# Patient Record
Sex: MALE | Race: WHITE | NOT HISPANIC OR LATINO | URBAN - METROPOLITAN AREA
[De-identification: names, ages, dates, MRNs, and addresses within clinical notes are randomized per-mention and may not be internally consistent; named-entity substitution may affect disease eponyms.]

---

## 2017-03-06 RX ORDER — INSULIN LISPRO 100/ML
0 VIAL (ML) SUBCUTANEOUS
Qty: 60 | Refills: 0 | DISCHARGE
Start: 2017-03-06

## 2017-04-19 RX ORDER — IBUPROFEN 200 MG
0 TABLET ORAL
Qty: 20 | Refills: 0 | DISCHARGE
Start: 2017-04-19

## 2017-04-26 RX ORDER — LEVOTHYROXINE SODIUM 125 MCG
0 TABLET ORAL
Qty: 90 | Refills: 0 | DISCHARGE
Start: 2017-04-26

## 2017-05-03 ENCOUNTER — OUTPATIENT (OUTPATIENT)
Dept: OUTPATIENT SERVICES | Facility: HOSPITAL | Age: 57
LOS: 1 days | End: 2017-05-03
Payer: COMMERCIAL

## 2017-05-03 VITALS
TEMPERATURE: 98 F | RESPIRATION RATE: 16 BRPM | SYSTOLIC BLOOD PRESSURE: 146 MMHG | HEIGHT: 68 IN | DIASTOLIC BLOOD PRESSURE: 83 MMHG | WEIGHT: 184.53 LBS

## 2017-05-03 DIAGNOSIS — M75.00 ADHESIVE CAPSULITIS OF UNSPECIFIED SHOULDER: ICD-10-CM

## 2017-05-03 DIAGNOSIS — E03.9 HYPOTHYROIDISM, UNSPECIFIED: ICD-10-CM

## 2017-05-03 DIAGNOSIS — Z01.818 ENCOUNTER FOR OTHER PREPROCEDURAL EXAMINATION: ICD-10-CM

## 2017-05-03 DIAGNOSIS — G56.02 CARPAL TUNNEL SYNDROME, LEFT UPPER LIMB: Chronic | ICD-10-CM

## 2017-05-03 DIAGNOSIS — Z90.89 ACQUIRED ABSENCE OF OTHER ORGANS: Chronic | ICD-10-CM

## 2017-05-03 DIAGNOSIS — E11.9 TYPE 2 DIABETES MELLITUS WITHOUT COMPLICATIONS: ICD-10-CM

## 2017-05-03 LAB
ALBUMIN SERPL ELPH-MCNC: 3.7 G/DL — SIGNIFICANT CHANGE UP (ref 3.3–5)
ALP SERPL-CCNC: 92 U/L — SIGNIFICANT CHANGE UP (ref 40–120)
ALT FLD-CCNC: 34 U/L — SIGNIFICANT CHANGE UP (ref 12–78)
ANION GAP SERPL CALC-SCNC: 9 MMOL/L — SIGNIFICANT CHANGE UP (ref 5–17)
AST SERPL-CCNC: 22 U/L — SIGNIFICANT CHANGE UP (ref 15–37)
BILIRUB SERPL-MCNC: 0.4 MG/DL — SIGNIFICANT CHANGE UP (ref 0.2–1.2)
BUN SERPL-MCNC: 16 MG/DL — SIGNIFICANT CHANGE UP (ref 7–23)
CALCIUM SERPL-MCNC: 9 MG/DL — SIGNIFICANT CHANGE UP (ref 8.5–10.1)
CHLORIDE SERPL-SCNC: 105 MMOL/L — SIGNIFICANT CHANGE UP (ref 96–108)
CO2 SERPL-SCNC: 26 MMOL/L — SIGNIFICANT CHANGE UP (ref 22–31)
CREAT SERPL-MCNC: 0.87 MG/DL — SIGNIFICANT CHANGE UP (ref 0.5–1.3)
GLUCOSE SERPL-MCNC: 81 MG/DL — SIGNIFICANT CHANGE UP (ref 70–99)
HBA1C BLD-MCNC: 6.8 % — HIGH (ref 4–5.6)
HCT VFR BLD CALC: 44.5 % — SIGNIFICANT CHANGE UP (ref 39–50)
HGB BLD-MCNC: 15.1 G/DL — SIGNIFICANT CHANGE UP (ref 13–17)
MCHC RBC-ENTMCNC: 32 PG — SIGNIFICANT CHANGE UP (ref 27–34)
MCHC RBC-ENTMCNC: 34 GM/DL — SIGNIFICANT CHANGE UP (ref 32–36)
MCV RBC AUTO: 94.2 FL — SIGNIFICANT CHANGE UP (ref 80–100)
PLATELET # BLD AUTO: 267 K/UL — SIGNIFICANT CHANGE UP (ref 150–400)
POTASSIUM SERPL-MCNC: 3.8 MMOL/L — SIGNIFICANT CHANGE UP (ref 3.5–5.3)
POTASSIUM SERPL-SCNC: 3.8 MMOL/L — SIGNIFICANT CHANGE UP (ref 3.5–5.3)
PROT SERPL-MCNC: 7.4 G/DL — SIGNIFICANT CHANGE UP (ref 6–8.3)
RBC # BLD: 4.72 M/UL — SIGNIFICANT CHANGE UP (ref 4.2–5.8)
RBC # FLD: 12.3 % — SIGNIFICANT CHANGE UP (ref 10.3–14.5)
SODIUM SERPL-SCNC: 140 MMOL/L — SIGNIFICANT CHANGE UP (ref 135–145)
WBC # BLD: 10.8 K/UL — HIGH (ref 3.8–10.5)
WBC # FLD AUTO: 10.8 K/UL — HIGH (ref 3.8–10.5)

## 2017-05-03 PROCEDURE — 83036 HEMOGLOBIN GLYCOSYLATED A1C: CPT

## 2017-05-03 PROCEDURE — 93010 ELECTROCARDIOGRAM REPORT: CPT | Mod: NC

## 2017-05-03 PROCEDURE — G0463: CPT

## 2017-05-03 PROCEDURE — 85027 COMPLETE CBC AUTOMATED: CPT

## 2017-05-03 PROCEDURE — 80053 COMPREHEN METABOLIC PANEL: CPT

## 2017-05-03 PROCEDURE — 93005 ELECTROCARDIOGRAM TRACING: CPT

## 2017-05-03 RX ORDER — DEXTROSE 50 % IN WATER 50 %
1 SYRINGE (ML) INTRAVENOUS ONCE
Qty: 0 | Refills: 0 | Status: DISCONTINUED | OUTPATIENT
Start: 2017-05-10 | End: 2017-05-25

## 2017-05-03 RX ORDER — SODIUM CHLORIDE 9 MG/ML
1000 INJECTION, SOLUTION INTRAVENOUS
Qty: 0 | Refills: 0 | Status: DISCONTINUED | OUTPATIENT
Start: 2017-05-10 | End: 2017-05-25

## 2017-05-03 NOTE — H&P PST ADULT - ASSESSMENT
57 yo male scheduled for Right Shoulder Arthroscopy Arthrotomy Manipulation Calcium Possible Rotator Cuff Repair Clavicle Resection with Dr Clayton on 5/10/17. 55 yo male with Type 1 DM  scheduled for Right Shoulder Arthroscopy Arthrotomy Manipulation Calcium Possible Rotator Cuff Repair Clavicle Resection with Dr Clayton on 5/10/17.

## 2017-05-03 NOTE — H&P PST ADULT - PMH
Adhesive capsulitis of shoulder, unspecified laterality    Diabetes mellitus  Type 1  Hypothyroidism

## 2017-05-03 NOTE — H&P PST ADULT - PROBLEM SELECTOR PLAN 1
57 yo male scheduled for Right Shoulder Arthroscopy Arthrotomy Manipulation Calcium Possible Rotator Cuff Repair Clavicle Resection with Dr Clayton on 5/10/17.  Check labs CBC CMP HGB A1C   EKG  Endocrinology Clearance Patient sees Endo every 3 months, does not have PCP  Hibiclens and Preop Instructions given  5/3 stop Motrin

## 2017-05-03 NOTE — H&P PST ADULT - NSANTHOSAYNRD_GEN_A_CORE
No. MELANIE screening performed.  STOP BANG Legend: 0-2 = LOW Risk; 3-4 = INTERMEDIATE Risk; 5-8 = HIGH Risk

## 2017-05-03 NOTE — H&P PST ADULT - PROBLEM SELECTOR PLAN 2
Patient is aware that he will need Endocrinology Clearance if HGB A1C is 9 or greater  Insulin Pump Dosing as per Endocrinologist  Fingerstick on admission  Hypoglycemia Protocol  Bring extra Insulin Pump Tubing the morning of surgery Patient is aware that he will need Endocrinology Clearance if HGB A1C is 9 or greater  Insulin Pump Dosing as per Endocrinologist  Fingerstick on admission  Pat Weil NP CDE in to speak with patient  Hypoglycemia Protocol  Bring 3 extra Insulin Pump Tubing the morning of surgery

## 2017-05-03 NOTE — H&P PST ADULT - FAMILY HISTORY
Mother  Still living? No  Family history of COPD (chronic obstructive pulmonary disease), Age at diagnosis: Age Unknown  Family history of bowel disorder, Age at diagnosis: Age Unknown     Father  Still living? No  Family history of cardiac disorder, Age at diagnosis: Age Unknown

## 2017-05-03 NOTE — H&P PST ADULT - HISTORY OF PRESENT ILLNESS
Patient states he has had pain in right shoulder since last year.  Pain has been intermittent, some relief from Cortisone Injection. Pain is having numbness in right hand.  Pain is worse with activity.  Right Hand Dominant 55 yo male with Type 1 DM on Insulin Pump scheduled for Right Shoulder Arthroscopy Arthrotomy Manipulation Calcium Possible Rotator Cuff Repair Clavicle Resection with Dr Clayton on 5/10/17. Patient states he has had pain in right shoulder since last year.  Pain has been intermittent, some relief from Cortisone Injection but Cortisone affected his blood sugars. Pain is having numbness in right hand.  Pain is worse with activity.  Right Hand Dominant

## 2017-05-09 RX ORDER — SODIUM CHLORIDE 9 MG/ML
1000 INJECTION, SOLUTION INTRAVENOUS
Qty: 0 | Refills: 0 | Status: DISCONTINUED | OUTPATIENT
Start: 2017-05-10 | End: 2017-05-10

## 2017-05-10 ENCOUNTER — OUTPATIENT (OUTPATIENT)
Dept: OUTPATIENT SERVICES | Facility: HOSPITAL | Age: 57
LOS: 1 days | Discharge: HOME | End: 2017-05-10
Payer: COMMERCIAL

## 2017-05-10 ENCOUNTER — TRANSCRIPTION ENCOUNTER (OUTPATIENT)
Age: 57
End: 2017-05-10

## 2017-05-10 ENCOUNTER — RESULT REVIEW (OUTPATIENT)
Age: 57
End: 2017-05-10

## 2017-05-10 VITALS
SYSTOLIC BLOOD PRESSURE: 128 MMHG | HEART RATE: 76 BPM | OXYGEN SATURATION: 95 % | DIASTOLIC BLOOD PRESSURE: 62 MMHG | RESPIRATION RATE: 14 BRPM

## 2017-05-10 VITALS
RESPIRATION RATE: 14 BRPM | HEART RATE: 70 BPM | WEIGHT: 184.53 LBS | SYSTOLIC BLOOD PRESSURE: 127 MMHG | DIASTOLIC BLOOD PRESSURE: 71 MMHG | HEIGHT: 68 IN | TEMPERATURE: 98 F | OXYGEN SATURATION: 95 %

## 2017-05-10 DIAGNOSIS — G56.02 CARPAL TUNNEL SYNDROME, LEFT UPPER LIMB: Chronic | ICD-10-CM

## 2017-05-10 DIAGNOSIS — M75.00 ADHESIVE CAPSULITIS OF UNSPECIFIED SHOULDER: ICD-10-CM

## 2017-05-10 DIAGNOSIS — Z90.89 ACQUIRED ABSENCE OF OTHER ORGANS: Chronic | ICD-10-CM

## 2017-05-10 LAB
ANION GAP SERPL CALC-SCNC: 8 MMOL/L — SIGNIFICANT CHANGE UP (ref 5–17)
BUN SERPL-MCNC: 23 MG/DL — SIGNIFICANT CHANGE UP (ref 7–23)
CALCIUM SERPL-MCNC: 8.2 MG/DL — LOW (ref 8.5–10.1)
CHLORIDE SERPL-SCNC: 105 MMOL/L — SIGNIFICANT CHANGE UP (ref 96–108)
CO2 SERPL-SCNC: 25 MMOL/L — SIGNIFICANT CHANGE UP (ref 22–31)
CREAT SERPL-MCNC: 0.98 MG/DL — SIGNIFICANT CHANGE UP (ref 0.5–1.3)
GLUCOSE SERPL-MCNC: 252 MG/DL — HIGH (ref 70–99)
POTASSIUM SERPL-MCNC: 4.1 MMOL/L — SIGNIFICANT CHANGE UP (ref 3.5–5.3)
POTASSIUM SERPL-SCNC: 4.1 MMOL/L — SIGNIFICANT CHANGE UP (ref 3.5–5.3)
SODIUM SERPL-SCNC: 138 MMOL/L — SIGNIFICANT CHANGE UP (ref 135–145)

## 2017-05-10 PROCEDURE — 88304 TISSUE EXAM BY PATHOLOGIST: CPT

## 2017-05-10 PROCEDURE — 71045 X-RAY EXAM CHEST 1 VIEW: CPT

## 2017-05-10 PROCEDURE — 88304 TISSUE EXAM BY PATHOLOGIST: CPT | Mod: 26

## 2017-05-10 PROCEDURE — 71010: CPT | Mod: 26

## 2017-05-10 PROCEDURE — 29824 SHO ARTHRS SRG DSTL CLAVICLC: CPT | Mod: RT

## 2017-05-10 PROCEDURE — 29827 SHO ARTHRS SRG RT8TR CUF RPR: CPT | Mod: RT

## 2017-05-10 PROCEDURE — 80048 BASIC METABOLIC PNL TOTAL CA: CPT

## 2017-05-10 PROCEDURE — C1713: CPT

## 2017-05-10 RX ORDER — SODIUM CHLORIDE 9 MG/ML
1000 INJECTION, SOLUTION INTRAVENOUS
Qty: 0 | Refills: 0 | Status: DISCONTINUED | OUTPATIENT
Start: 2017-05-10 | End: 2017-05-10

## 2017-05-10 RX ORDER — CEFAZOLIN SODIUM 1 G
2000 VIAL (EA) INJECTION ONCE
Qty: 0 | Refills: 0 | Status: COMPLETED | OUTPATIENT
Start: 2017-05-10 | End: 2017-05-10

## 2017-05-10 RX ORDER — OXYCODONE HYDROCHLORIDE 5 MG/1
1 TABLET ORAL
Qty: 30 | Refills: 0
Start: 2017-05-10

## 2017-05-10 RX ORDER — HYDROMORPHONE HYDROCHLORIDE 2 MG/ML
0.5 INJECTION INTRAMUSCULAR; INTRAVENOUS; SUBCUTANEOUS
Qty: 0 | Refills: 0 | Status: DISCONTINUED | OUTPATIENT
Start: 2017-05-10 | End: 2017-05-10

## 2017-05-10 RX ORDER — ONDANSETRON 8 MG/1
4 TABLET, FILM COATED ORAL ONCE
Qty: 0 | Refills: 0 | Status: COMPLETED | OUTPATIENT
Start: 2017-05-10 | End: 2017-05-10

## 2017-05-10 RX ADMIN — ONDANSETRON 4 MILLIGRAM(S): 8 TABLET, FILM COATED ORAL at 13:09

## 2017-05-10 RX ADMIN — HYDROMORPHONE HYDROCHLORIDE 0.5 MILLIGRAM(S): 2 INJECTION INTRAMUSCULAR; INTRAVENOUS; SUBCUTANEOUS at 10:45

## 2017-05-10 RX ADMIN — SODIUM CHLORIDE 50 MILLILITER(S): 9 INJECTION, SOLUTION INTRAVENOUS at 10:21

## 2017-05-10 RX ADMIN — HYDROMORPHONE HYDROCHLORIDE 0.5 MILLIGRAM(S): 2 INJECTION INTRAMUSCULAR; INTRAVENOUS; SUBCUTANEOUS at 10:18

## 2017-05-10 RX ADMIN — SODIUM CHLORIDE 30 MILLILITER(S): 9 INJECTION, SOLUTION INTRAVENOUS at 06:55

## 2017-05-10 RX ADMIN — HYDROMORPHONE HYDROCHLORIDE 0.5 MILLIGRAM(S): 2 INJECTION INTRAMUSCULAR; INTRAVENOUS; SUBCUTANEOUS at 11:26

## 2017-05-10 RX ADMIN — HYDROMORPHONE HYDROCHLORIDE 0.5 MILLIGRAM(S): 2 INJECTION INTRAMUSCULAR; INTRAVENOUS; SUBCUTANEOUS at 11:10

## 2017-05-10 RX ADMIN — HYDROMORPHONE HYDROCHLORIDE 0.5 MILLIGRAM(S): 2 INJECTION INTRAMUSCULAR; INTRAVENOUS; SUBCUTANEOUS at 11:43

## 2017-05-10 RX ADMIN — HYDROMORPHONE HYDROCHLORIDE 0.5 MILLIGRAM(S): 2 INJECTION INTRAMUSCULAR; INTRAVENOUS; SUBCUTANEOUS at 10:33

## 2017-05-10 RX ADMIN — HYDROMORPHONE HYDROCHLORIDE 0.5 MILLIGRAM(S): 2 INJECTION INTRAMUSCULAR; INTRAVENOUS; SUBCUTANEOUS at 10:32

## 2017-05-10 RX ADMIN — HYDROMORPHONE HYDROCHLORIDE 0.5 MILLIGRAM(S): 2 INJECTION INTRAMUSCULAR; INTRAVENOUS; SUBCUTANEOUS at 11:28

## 2017-05-10 NOTE — BRIEF OPERATIVE NOTE - PROCEDURE
Shoulder arthroscopy  05/10/2017  Right shoulder arthroscopy with:  1. Labral debridement  2. Subacromial decompression  3. Distal clavicle excision  4. Exploration/inspection of rotator cuff/calcific deposit  5. Rotator cuff repair with [employer] Speed Screw x 1  Active  ERENDIRAELLO

## 2017-05-10 NOTE — BRIEF OPERATIVE NOTE - POST-OP DX
AC (acromioclavicular) arthritis  05/10/2017    Phuc Cmaeron  Calcific tendonitis of right shoulder  05/10/2017    Phuc Cameron  Degenerative tear of glenoid labrum of right shoulder  05/10/2017  Degeneration/fraying of right glenoid labrum  Phuc Cameron  Subacromial impingement, right  05/10/2017    Phuc Cameron

## 2017-05-10 NOTE — ASU DISCHARGE PLAN (ADULT/PEDIATRIC). - MEDICATION SUMMARY - MEDICATIONS TO TAKE
I will START or STAY ON the medications listed below when I get home from the hospital:    Probiotics  --     -- Indication: For prior med    acetaminophen-oxycodone 325 mg-5 mg oral tablet  -- 1 tab(s) by mouth every 6 hours, As Needed -for severe pain MDD:4  -- Caution federal law prohibits the transfer of this drug to any person other  than the person for whom it was prescribed.  May cause drowsiness.  Alcohol may intensify this effect.  Use care when operating dangerous machinery.  This prescription cannot be refilled.  This product contains acetaminophen.  Do not use  with any other product containing acetaminophen to prevent possible liver damage.  Using more of this medication than prescribed may cause serious breathing problems.    -- Indication: For pain    IBUPROFEN 800 MG TABLET  -- Indication: For prior med    HUMALOG 100 UNITS/ML VIAL  -- Indication: For prior med    SYNTHROID 100 MCG TABLET  -- Indication: For prior med

## 2017-05-10 NOTE — PROVIDER CONTACT NOTE (OTHER) - SITUATION
Pt o2 sat drops into high 80s when o2 is removed.  Sat improves when using incentive spirometer however drops as soon as he stops.  Pt noted to be diaphoretic.

## 2017-05-11 LAB — SURGICAL PATHOLOGY FINAL REPORT - CH: SIGNIFICANT CHANGE UP

## 2017-05-13 DIAGNOSIS — Z87.891 PERSONAL HISTORY OF NICOTINE DEPENDENCE: ICD-10-CM

## 2017-05-13 DIAGNOSIS — M19.011 PRIMARY OSTEOARTHRITIS, RIGHT SHOULDER: ICD-10-CM

## 2017-05-13 DIAGNOSIS — M75.101 UNSPECIFIED ROTATOR CUFF TEAR OR RUPTURE OF RIGHT SHOULDER, NOT SPECIFIED AS TRAUMATIC: ICD-10-CM

## 2017-05-13 DIAGNOSIS — M75.31 CALCIFIC TENDINITIS OF RIGHT SHOULDER: ICD-10-CM

## 2017-05-13 DIAGNOSIS — Z79.1 LONG TERM (CURRENT) USE OF NON-STEROIDAL ANTI-INFLAMMATORIES (NSAID): ICD-10-CM

## 2017-05-13 DIAGNOSIS — Z79.4 LONG TERM (CURRENT) USE OF INSULIN: ICD-10-CM

## 2017-05-13 DIAGNOSIS — E03.9 HYPOTHYROIDISM, UNSPECIFIED: ICD-10-CM

## 2017-05-13 DIAGNOSIS — M75.41 IMPINGEMENT SYNDROME OF RIGHT SHOULDER: ICD-10-CM

## 2017-05-13 DIAGNOSIS — E10.9 TYPE 1 DIABETES MELLITUS WITHOUT COMPLICATIONS: ICD-10-CM

## 2017-05-13 DIAGNOSIS — Z96.41 PRESENCE OF INSULIN PUMP (EXTERNAL) (INTERNAL): ICD-10-CM

## 2017-05-13 DIAGNOSIS — M67.813 OTHER SPECIFIED DISORDERS OF TENDON, RIGHT SHOULDER: ICD-10-CM

## 2019-07-09 PROBLEM — M75.00 ADHESIVE CAPSULITIS OF UNSPECIFIED SHOULDER: Chronic | Status: ACTIVE | Noted: 2017-05-03

## 2019-07-30 ENCOUNTER — OUTPATIENT (OUTPATIENT)
Dept: OUTPATIENT SERVICES | Facility: HOSPITAL | Age: 59
LOS: 1 days | End: 2019-07-30
Payer: COMMERCIAL

## 2019-07-30 VITALS
WEIGHT: 179.9 LBS | OXYGEN SATURATION: 98 % | DIASTOLIC BLOOD PRESSURE: 60 MMHG | TEMPERATURE: 98 F | HEART RATE: 68 BPM | RESPIRATION RATE: 16 BRPM | SYSTOLIC BLOOD PRESSURE: 132 MMHG | HEIGHT: 68 IN

## 2019-07-30 DIAGNOSIS — Z90.89 ACQUIRED ABSENCE OF OTHER ORGANS: Chronic | ICD-10-CM

## 2019-07-30 DIAGNOSIS — G56.01 CARPAL TUNNEL SYNDROME, RIGHT UPPER LIMB: ICD-10-CM

## 2019-07-30 DIAGNOSIS — G56.02 CARPAL TUNNEL SYNDROME, LEFT UPPER LIMB: Chronic | ICD-10-CM

## 2019-07-30 DIAGNOSIS — Z01.818 ENCOUNTER FOR OTHER PREPROCEDURAL EXAMINATION: ICD-10-CM

## 2019-07-30 LAB
ANION GAP SERPL CALC-SCNC: 6 MMOL/L — SIGNIFICANT CHANGE UP (ref 5–17)
BUN SERPL-MCNC: 21 MG/DL — SIGNIFICANT CHANGE UP (ref 7–23)
CALCIUM SERPL-MCNC: 9.1 MG/DL — SIGNIFICANT CHANGE UP (ref 8.5–10.1)
CHLORIDE SERPL-SCNC: 106 MMOL/L — SIGNIFICANT CHANGE UP (ref 96–108)
CO2 SERPL-SCNC: 28 MMOL/L — SIGNIFICANT CHANGE UP (ref 22–31)
CREAT SERPL-MCNC: 0.93 MG/DL — SIGNIFICANT CHANGE UP (ref 0.5–1.3)
GLUCOSE SERPL-MCNC: 138 MG/DL — HIGH (ref 70–99)
HBA1C BLD-MCNC: 6.7 % — HIGH (ref 4–5.6)
HCT VFR BLD CALC: 44 % — SIGNIFICANT CHANGE UP (ref 39–50)
HGB BLD-MCNC: 14.5 G/DL — SIGNIFICANT CHANGE UP (ref 13–17)
MCHC RBC-ENTMCNC: 31.5 PG — SIGNIFICANT CHANGE UP (ref 27–34)
MCHC RBC-ENTMCNC: 33 GM/DL — SIGNIFICANT CHANGE UP (ref 32–36)
MCV RBC AUTO: 95.4 FL — SIGNIFICANT CHANGE UP (ref 80–100)
NRBC # BLD: 0 /100 WBCS — SIGNIFICANT CHANGE UP (ref 0–0)
PLATELET # BLD AUTO: 287 K/UL — SIGNIFICANT CHANGE UP (ref 150–400)
POTASSIUM SERPL-MCNC: 4.2 MMOL/L — SIGNIFICANT CHANGE UP (ref 3.5–5.3)
POTASSIUM SERPL-SCNC: 4.2 MMOL/L — SIGNIFICANT CHANGE UP (ref 3.5–5.3)
RBC # BLD: 4.61 M/UL — SIGNIFICANT CHANGE UP (ref 4.2–5.8)
RBC # FLD: 13.2 % — SIGNIFICANT CHANGE UP (ref 10.3–14.5)
SODIUM SERPL-SCNC: 140 MMOL/L — SIGNIFICANT CHANGE UP (ref 135–145)
WBC # BLD: 9.5 K/UL — SIGNIFICANT CHANGE UP (ref 3.8–10.5)
WBC # FLD AUTO: 9.5 K/UL — SIGNIFICANT CHANGE UP (ref 3.8–10.5)

## 2019-07-30 PROCEDURE — 85027 COMPLETE CBC AUTOMATED: CPT

## 2019-07-30 PROCEDURE — 83036 HEMOGLOBIN GLYCOSYLATED A1C: CPT

## 2019-07-30 PROCEDURE — 93005 ELECTROCARDIOGRAM TRACING: CPT

## 2019-07-30 PROCEDURE — 93010 ELECTROCARDIOGRAM REPORT: CPT

## 2019-07-30 PROCEDURE — 80048 BASIC METABOLIC PNL TOTAL CA: CPT

## 2019-07-30 PROCEDURE — 36415 COLL VENOUS BLD VENIPUNCTURE: CPT

## 2019-07-30 PROCEDURE — G0463: CPT

## 2019-07-30 RX ORDER — DEXTROSE 50 % IN WATER 50 %
12.5 SYRINGE (ML) INTRAVENOUS ONCE
Refills: 0 | Status: DISCONTINUED | OUTPATIENT
Start: 2019-08-07 | End: 2019-08-29

## 2019-07-30 RX ORDER — SODIUM CHLORIDE 9 MG/ML
1000 INJECTION, SOLUTION INTRAVENOUS
Refills: 0 | Status: DISCONTINUED | OUTPATIENT
Start: 2019-08-07 | End: 2019-08-29

## 2019-07-30 RX ORDER — GLUCAGON INJECTION, SOLUTION 0.5 MG/.1ML
1 INJECTION, SOLUTION SUBCUTANEOUS ONCE
Refills: 0 | Status: DISCONTINUED | OUTPATIENT
Start: 2019-08-07 | End: 2019-08-29

## 2019-07-30 RX ORDER — DEXTROSE 50 % IN WATER 50 %
25 SYRINGE (ML) INTRAVENOUS ONCE
Refills: 0 | Status: DISCONTINUED | OUTPATIENT
Start: 2019-08-07 | End: 2019-08-29

## 2019-07-30 RX ORDER — DEXTROSE 50 % IN WATER 50 %
15 SYRINGE (ML) INTRAVENOUS ONCE
Refills: 0 | Status: DISCONTINUED | OUTPATIENT
Start: 2019-08-07 | End: 2019-08-29

## 2019-07-30 NOTE — H&P PST ADULT - NSICDXPASTSURGICALHX_GEN_ALL_CORE_FT
PAST SURGICAL HISTORY:  Acute carpal tunnel syndrome of left wrist and left thumb trigger finger 2013    S/P tonsillectomy

## 2019-07-30 NOTE — H&P PST ADULT - URINARY CATHETER
[facility] [place] no

## 2019-07-30 NOTE — H&P PST ADULT - NSICDXPASTMEDICALHX_GEN_ALL_CORE_FT
PAST MEDICAL HISTORY:  Adhesive capsulitis of shoulder, unspecified laterality     Diabetes mellitus Type 1    Hypothyroidism

## 2019-07-30 NOTE — H&P PST ADULT - VISION (WITH CORRECTIVE LENSES IF THE PATIENT USUALLY WEARS THEM):
Discharge Planning Assessment Post Partum     Reason for Referral: History of marijuana use 10 months ago  Address: [facility] [place] [address]  Phone: [phone]  Type of Living Situation: living with her mother  Mom Diagnosis: Pregnancy  Baby Diagnosis: [place]  Primary Language: MOB speaks English     Name of Baby: Prince Cotto (: 19)  Father of the Baby: Not involved  Involved in baby’s care? No  Contact Information: N/A     Prenatal Care: Yes  Mom's PCP: Dr. Morgan  PCP for new baby: Dr. Morgan     Support System: Maternal Grandmother-Kay Clark  Coping/Bonding between mother & baby: Yes  Supplies for Infant: prepared for infant; denies any needs     Mom's Insurance: [employer] and [employer]  Baby Covered on Insurance: Yes, [employer]  Mother Employed/School: Not currently  Other children in the home/names & ages: none, 1st baby     Financial Hardship/Income: denies   Mom's Mental status: alert and oriented  Services used prior to admit: Medicaid, TANF, food stamps, and WIC     CPS History: No  Psychiatric History: No  Domestic Violence History: No  Drug/ETOH History: history of marijuana prior to pregnancy.  Infant's UDS is pending.     Resources Provided: children and family resource list and post partum support resources  Referrals Made: diaper bank referral provided      Clearance for Discharge: Infant's UDS is pending.  If negative, infant is cleared to discharge home with MOB.                       Normal vision: sees adequately in most situations; can see medication labels, newsprint

## 2019-07-30 NOTE — H&P PST ADULT - HISTORY OF PRESENT ILLNESS
59 y/o male , PMH of T1DM, hypothyroidism, with c/o of pain in the right wrist, diagnosed with carpal tunnel few yrs ago. Had left carpal tunnel release about 7 yrs ago, now scheduled for right carpal tunnel release. Pre op testing today. 59 y/o male , PMH of T1DM, hypothyroidism,  on Insulin Pump in PST today with c/o of pain in the right wrist, diagnosed with carpal tunnel few yrs ago. Had left carpal tunnel release about 7 yrs ago, now scheduled for right carpal tunnel release. Pre op testing today.

## 2019-07-30 NOTE — H&P PST ADULT - ASSESSMENT
57 y/o male , PMH of T1DM, hypothyroidism, with c/o of pain in the right wrist, diagnosed with carpal tunnel few yrs ago. Had left carpal tunnel release about 7 yrs ago, now scheduled for right carpal tunnel release. Pre op testing today. 59 y/o male , PMH of T1DM, hypothyroidism,  on Insulin Pump in PST today with c/o of pain in the right wrist, diagnosed with carpal tunnel few yrs ago. Had left carpal tunnel release about 7 yrs ago, now scheduled for right carpal tunnel release. Pre op testing today.  medical/endocrine eval advised.

## 2019-07-30 NOTE — H&P PST ADULT - NSICDXFAMILYHX_GEN_ALL_CORE_FT
FAMILY HISTORY:  FH: stroke, Mother:     Father  Still living? No  Family history of cardiac disorder, Age at diagnosis: Age Unknown    Mother  Still living? No  Family history of bowel disorder, Age at diagnosis: Age Unknown  Family history of COPD (chronic obstructive pulmonary disease), Age at diagnosis: Age Unknown

## 2019-08-06 ENCOUNTER — TRANSCRIPTION ENCOUNTER (OUTPATIENT)
Age: 59
End: 2019-08-06

## 2019-08-06 NOTE — ADVANCED PRACTICE NURSE CONSULT - REASON FOR CONSULT
58y    Male    Patient is a 58y old  Male who presents with a chief complaint of     HPI:      PAST MEDICAL & SURGICAL HISTORY:  Adhesive capsulitis of shoulder, unspecified laterality  Hypothyroidism  Diabetes mellitus: Type 1  S/P tonsillectomy  Acute carpal tunnel syndrome of left wrist: and left thumb trigger finger 2013      MEDICATIONS  (STANDING):

## 2019-08-06 NOTE — ADVANCED PRACTICE NURSE CONSULT - RECOMMEDATIONS
Type 1  A1c 6.7% scheduled for CTR  SW Patient: reminded to wear pump/sensor on opposite side of procedure  endocrine consult obtained  Aw[employer] 670 pump and sensor  Goal 100-180 mg/dL

## 2019-08-06 NOTE — ADVANCED PRACTICE NURSE CONSULT - ASSESSMENT
Vital Signs Last 24 Hrs  T(C): --  T(F): --  HR: --  BP: --  BP(mean): --  RR: --  SpO2: --    Hemoglobin A1C, Whole Blood: 6.7 % (07-30-19 @ 11:08)       CAPILLARY BLOOD GLUCOSE          DIET: CC  %

## 2019-08-07 ENCOUNTER — OUTPATIENT (OUTPATIENT)
Dept: OUTPATIENT SERVICES | Facility: HOSPITAL | Age: 59
LOS: 1 days | End: 2019-08-07
Payer: COMMERCIAL

## 2019-08-07 VITALS
HEIGHT: 68 IN | DIASTOLIC BLOOD PRESSURE: 64 MMHG | RESPIRATION RATE: 15 BRPM | HEART RATE: 65 BPM | OXYGEN SATURATION: 97 % | TEMPERATURE: 98 F | WEIGHT: 179.9 LBS | SYSTOLIC BLOOD PRESSURE: 152 MMHG

## 2019-08-07 VITALS
HEART RATE: 48 BPM | RESPIRATION RATE: 15 BRPM | OXYGEN SATURATION: 95 % | SYSTOLIC BLOOD PRESSURE: 133 MMHG | DIASTOLIC BLOOD PRESSURE: 68 MMHG | TEMPERATURE: 98 F

## 2019-08-07 DIAGNOSIS — Z90.89 ACQUIRED ABSENCE OF OTHER ORGANS: Chronic | ICD-10-CM

## 2019-08-07 DIAGNOSIS — Z01.818 ENCOUNTER FOR OTHER PREPROCEDURAL EXAMINATION: ICD-10-CM

## 2019-08-07 DIAGNOSIS — G56.02 CARPAL TUNNEL SYNDROME, LEFT UPPER LIMB: Chronic | ICD-10-CM

## 2019-08-07 DIAGNOSIS — E10.9 TYPE 1 DIABETES MELLITUS WITHOUT COMPLICATIONS: ICD-10-CM

## 2019-08-07 DIAGNOSIS — G56.01 CARPAL TUNNEL SYNDROME, RIGHT UPPER LIMB: ICD-10-CM

## 2019-08-07 PROCEDURE — 64721 CARPAL TUNNEL SURGERY: CPT | Mod: RT

## 2019-08-07 PROCEDURE — 82962 GLUCOSE BLOOD TEST: CPT

## 2019-08-07 RX ORDER — SODIUM CHLORIDE 9 MG/ML
1000 INJECTION, SOLUTION INTRAVENOUS
Refills: 0 | Status: DISCONTINUED | OUTPATIENT
Start: 2019-08-07 | End: 2019-08-07

## 2019-08-07 RX ORDER — HYDROMORPHONE HYDROCHLORIDE 2 MG/ML
0.5 INJECTION INTRAMUSCULAR; INTRAVENOUS; SUBCUTANEOUS
Refills: 0 | Status: DISCONTINUED | OUTPATIENT
Start: 2019-08-07 | End: 2019-08-07

## 2019-08-07 RX ORDER — OXYCODONE AND ACETAMINOPHEN 5; 325 MG/1; MG/1
1 TABLET ORAL
Qty: 10 | Refills: 0
Start: 2019-08-07

## 2019-08-07 RX ORDER — ONDANSETRON 8 MG/1
4 TABLET, FILM COATED ORAL ONCE
Refills: 0 | Status: DISCONTINUED | OUTPATIENT
Start: 2019-08-07 | End: 2019-08-07

## 2019-08-07 RX ORDER — CEFAZOLIN SODIUM 1 G
2000 VIAL (EA) INJECTION ONCE
Refills: 0 | Status: COMPLETED | OUTPATIENT
Start: 2019-08-07 | End: 2019-08-07

## 2019-08-07 RX ORDER — OXYCODONE HYDROCHLORIDE 5 MG/1
5 TABLET ORAL ONCE
Refills: 0 | Status: DISCONTINUED | OUTPATIENT
Start: 2019-08-07 | End: 2019-08-07

## 2019-08-07 RX ADMIN — SODIUM CHLORIDE 40 MILLILITER(S): 9 INJECTION, SOLUTION INTRAVENOUS at 08:47

## 2019-08-07 RX ADMIN — HYDROMORPHONE HYDROCHLORIDE 0.5 MILLIGRAM(S): 2 INJECTION INTRAMUSCULAR; INTRAVENOUS; SUBCUTANEOUS at 11:12

## 2019-08-07 RX ADMIN — SODIUM CHLORIDE 100 MILLILITER(S): 9 INJECTION, SOLUTION INTRAVENOUS at 11:00

## 2019-08-07 RX ADMIN — HYDROMORPHONE HYDROCHLORIDE 0.5 MILLIGRAM(S): 2 INJECTION INTRAMUSCULAR; INTRAVENOUS; SUBCUTANEOUS at 11:00

## 2019-08-07 RX ADMIN — HYDROMORPHONE HYDROCHLORIDE 0.5 MILLIGRAM(S): 2 INJECTION INTRAMUSCULAR; INTRAVENOUS; SUBCUTANEOUS at 11:27

## 2019-08-07 NOTE — ASU DISCHARGE PLAN (ADULT/PEDIATRIC) - CALL YOUR DOCTOR IF YOU HAVE ANY OF THE FOLLOWING:
Swelling that gets worse/Fever greater than (need to indicate Fahrenheit or Celsius)/Bleeding that does not stop/Pain not relieved by Medications Bleeding that does not stop/Swelling that gets worse/Nausea and vomiting that does not stop/Fever greater than (need to indicate Fahrenheit or Celsius)/Pain not relieved by Medications

## 2019-08-07 NOTE — ASU DISCHARGE PLAN (ADULT/PEDIATRIC) - ASU DC SPECIAL INSTRUCTIONSFT
Keep right upper extremity splint clean, dry and intact. Keep right upper extremity elevated at all times. Follow up with Dr. Clayton in his office in 7-10 days. You may follow up in his office within the next week if you would like the dressing/splint changed earlier than follow up appointment.  Take pain medication as prescribed for severe pain.

## 2019-08-07 NOTE — PROGRESS NOTE ADULT - SUBJECTIVE AND OBJECTIVE BOX
T2 with Medtronic pump/sensor- pump right thigh and sensor right abdomen intact.  mg/dL at home, holding 202 mg/dL via patient's sensor. (RN to obtain FS BG). 8:25am bolus 2 units Humalog U-100.

## 2019-08-07 NOTE — ASU DISCHARGE PLAN (ADULT/PEDIATRIC) - CARE PROVIDER_API CALL
Antoni Clayton)  Orthopaedic Surgery; Surgery of the Hand  [address] [place] [address]  Phone: [phone]  Fax: [phone]  Follow Up Time:

## 2019-08-07 NOTE — PROGRESS NOTE ADULT - PROBLEM SELECTOR PLAN 1
Will discuss case with Anesthesia when available  Reviewed pump/sensor with patient   via sensor-bolus 2 units Humalog  Goal 140-180 mg/dL while in periop setting

## 2019-10-30 ENCOUNTER — APPOINTMENT (OUTPATIENT)
Dept: GASTROENTEROLOGY | Facility: CLINIC | Age: 59
End: 2019-10-30
Payer: COMMERCIAL

## 2019-10-30 VITALS
SYSTOLIC BLOOD PRESSURE: 130 MMHG | HEART RATE: 88 BPM | OXYGEN SATURATION: 97 % | BODY MASS INDEX: 24.25 KG/M2 | DIASTOLIC BLOOD PRESSURE: 80 MMHG | HEIGHT: 68 IN | RESPIRATION RATE: 14 BRPM | WEIGHT: 160 LBS

## 2019-10-30 DIAGNOSIS — R10.9 ABDOMINAL DISTENSION (GASEOUS): ICD-10-CM

## 2019-10-30 DIAGNOSIS — R19.7 DIARRHEA, UNSPECIFIED: ICD-10-CM

## 2019-10-30 DIAGNOSIS — R11.0 NAUSEA: ICD-10-CM

## 2019-10-30 DIAGNOSIS — R14.0 ABDOMINAL DISTENSION (GASEOUS): ICD-10-CM

## 2019-10-30 DIAGNOSIS — Z12.11 ENCOUNTER FOR SCREENING FOR MALIGNANT NEOPLASM OF COLON: ICD-10-CM

## 2019-10-30 DIAGNOSIS — K59.09 OTHER CONSTIPATION: ICD-10-CM

## 2019-10-30 PROCEDURE — 99242 OFF/OP CONSLTJ NEW/EST SF 20: CPT

## 2019-10-30 RX ORDER — BLOOD SUGAR DIAGNOSTIC
STRIP MISCELLANEOUS
Qty: 600 | Refills: 0 | Status: ACTIVE | COMMUNITY
Start: 2019-09-22

## 2019-10-30 NOTE — REASON FOR VISIT
[Initial Evaluation] : an initial evaluation [FreeTextEntry1] : Intermittent abdominal bloating and cramping, mild nausea, long-term diabetic, questionable history of Burnham's esophagus on previous endoscopies in the past, family history of colitis in both mother and father, history of colon polyps, screening colonoscopy

## 2019-10-30 NOTE — PHYSICAL EXAM
[General Appearance - Alert] : alert [General Appearance - In No Acute Distress] : in no acute distress [General Appearance - Well Nourished] : well nourished [General Appearance - Well Developed] : well developed [General Appearance - Well-Appearing] : healthy appearing [Sclera] : the sclera and conjunctiva were normal [Neck Appearance] : the appearance of the neck was normal [Neck Cervical Mass (___cm)] : no neck mass was observed [Jugular Venous Distention Increased] : there was no jugular-venous distention [Auscultation Breath Sounds / Voice Sounds] : lungs were clear to auscultation bilaterally [Apical Impulse] : the apical impulse was normal [Heart Rate And Rhythm] : heart rate was normal and rhythm regular [Full Pulse] : the pedal pulses are present [Edema] : there was no peripheral edema [Bowel Sounds] : normal bowel sounds [Abdomen Soft] : soft [Abdomen Tenderness] : non-tender [Abdomen Mass (___ Cm)] : no abdominal mass palpated [Patient Refused] : rectal exam was refused by the patient [Cervical Lymph Nodes Enlarged Posterior Bilaterally] : posterior cervical [Cervical Lymph Nodes Enlarged Anterior Bilaterally] : anterior cervical [Supraclavicular Lymph Nodes Enlarged Bilaterally] : supraclavicular [Axillary Lymph Nodes Enlarged Bilaterally] : axillary [Femoral Lymph Nodes Enlarged Bilaterally] : femoral [Inguinal Lymph Nodes Enlarged Bilaterally] : inguinal [No CVA Tenderness] : no ~M costovertebral angle tenderness [No Spinal Tenderness] : no spinal tenderness [Abnormal Walk] : normal gait [Nail Clubbing] : no clubbing  or cyanosis of the fingernails [Musculoskeletal - Swelling] : no joint swelling seen [Motor Tone] : muscle strength and tone were normal [Skin Color & Pigmentation] : normal skin color and pigmentation [Skin Turgor] : normal skin turgor [] : no rash [No Focal Deficits] : no focal deficits [Oriented To Time, Place, And Person] : oriented to person, place, and time [Impaired Insight] : insight and judgment were intact [Affect] : the affect was normal

## 2019-10-30 NOTE — CONSULT LETTER
[Dear  ___] : Dear  [unfilled], [Consult Letter:] : I had the pleasure of evaluating your patient, [unfilled]. [Please see my note below.] : Please see my note below. [Consult Closing:] : Thank you very much for allowing me to participate in the care of this patient.  If you have any questions, please do not hesitate to contact me. [Sincerely,] : Sincerely, [FreeTextEntry2] : Dr. Ruddy Arenas\par \par Internal medicine - endocrinology, diabetes & metabolism\par \par [address], [place] [address]\par \par [phone] [FreeTextEntry3] : Royer Odell MD\par

## 2019-10-30 NOTE — ASSESSMENT
[FreeTextEntry1] : Impression\par \par Abdominal bloating\par \par Intermittent abdominal cramping\par \par Long-term diabetic\par \par Possible gastroparesis\par \par Family history of both mother and father having had colitis\par \par Possible history of colon polyps in the past\par \par Possible history of Burnham's esophagus in the past\par \par Possible history of irritable bowel syndrome\par \par Suggest\par \par Upper endoscopy with small bowel biopsies for celiac sprue and of the pathology and biopsies of the stomach for H. pylori evaluation for Burnham's esophagus\par \par Colonoscopy for evaluation for inflammatory bowel disease, polyps some of the pathology\par \par Obtain records from previous procedures\par \par Suprep\par \par Risks/benefits:\par The procedure, the risks and benefits and alternatives have been reviewed in great detail with the patient.  Risks including, but not limited to sedation such as cardiac and pulmonary compromise, the procedure itself such as bleeding requiring hospitalization, transfusion, surgery, temporary or permanent colostomy.  Perforation or puncture of the requiring hospitalization, surgery, temporary colostomy.\par It has been explained to the patient that though colonoscopy is thought to be the best screening exam for colon cancer and polyps, no screening exam can find all colon polyps or cancers.  \par The patient expresses understanding of the procedure and consents to undergoing the procedure.\par \par The laxative, or its risks benefits and alternatives have been thoroughly reviewed with the patient in great detail. The laxative instructions have been reviewed in great detail with the patient.\par

## 2019-10-30 NOTE — HISTORY OF PRESENT ILLNESS
[de-identified] : Dr. Ruddy Arenas\par \par Internal medicine - endocrinology, diabetes & metabolism\par \par [address] [place] [address], [place] [address]\par \par [phone]\par \par Very pleasant 59-year-old gentleman\par \par Long-term diabetic type I with insulin pump\par \par Bloating, some mild nausea, possible diagnosis of gastroparesis according to the patient made a long time ago\par \par Also possible diagnosis of Burnham's esophagus on upper endoscopy long time ago\par \par No heartburn or indigestion\par \par No dysphasia or odynophagia\par \par His abdominal bloating and cramping at times\par \par He is alternating intermittent loose bowel movements, nonbloody and constipation\par \par The symptoms are very chronic and have not changed\par \par He tells me he may have been diagnosed with irritable bowel syndrome in the past\par \par His mother and father have both had colitis\par \par His appetite is good\par \par No weight loss\par \par He believes he may have had colon polyps on previous examination

## 2020-04-25 ENCOUNTER — MESSAGE (OUTPATIENT)
Age: 60
End: 2020-04-25

## 2020-05-03 LAB
SARS-COV-2 IGG SERPL IA-ACNC: <0.1 INDEX
SARS-COV-2 IGG SERPL QL IA: NEGATIVE

## 2020-05-05 ENCOUNTER — APPOINTMENT (OUTPATIENT)
Dept: GASTROENTEROLOGY | Facility: AMBULATORY MEDICAL SERVICES | Age: 60
End: 2020-05-05

## 2020-09-14 ENCOUNTER — APPOINTMENT (OUTPATIENT)
Dept: GASTROENTEROLOGY | Facility: AMBULATORY MEDICAL SERVICES | Age: 60
End: 2020-09-14
Payer: COMMERCIAL

## 2020-09-14 PROCEDURE — 45378 DIAGNOSTIC COLONOSCOPY: CPT

## 2020-09-14 PROCEDURE — 43239 EGD BIOPSY SINGLE/MULTIPLE: CPT

## 2020-12-21 PROBLEM — Z12.11 ENCOUNTER FOR SCREENING COLONOSCOPY: Status: RESOLVED | Noted: 2019-10-30 | Resolved: 2020-12-21

## 2021-01-14 NOTE — ASU PREOP CHECKLIST - WEIGHT IN KG
----- Message from Brendon Thibodeaux MD sent at 1/14/2021  7:26 AM CST -----  On 4 mg QD  Hold med for 2 days, then resume at 3 mg QD  Repeat in 2-3 weeks   81.6

## 2021-01-27 NOTE — ASU PATIENT PROFILE, ADULT - PAIN CHRONIC, PROFILE
Bill For Surgical Tray: no Billing Type: [employer] Performing Laboratory: [id number] Expected Date Of Service: 01/27/2021 no

## 2021-02-21 ENCOUNTER — EMERGENCY (EMERGENCY)
Facility: HOSPITAL | Age: 61
LOS: 1 days | Discharge: HOME | End: 2021-02-21
Attending: INTERNAL MEDICINE | Admitting: INTERNAL MEDICINE
Payer: COMMERCIAL

## 2021-02-21 VITALS
OXYGEN SATURATION: 99 % | HEART RATE: 64 BPM | RESPIRATION RATE: 18 BRPM | WEIGHT: 184.97 LBS | DIASTOLIC BLOOD PRESSURE: 86 MMHG | HEIGHT: 68 IN | SYSTOLIC BLOOD PRESSURE: 192 MMHG

## 2021-02-21 DIAGNOSIS — G56.02 CARPAL TUNNEL SYNDROME, LEFT UPPER LIMB: Chronic | ICD-10-CM

## 2021-02-21 DIAGNOSIS — Z90.89 ACQUIRED ABSENCE OF OTHER ORGANS: Chronic | ICD-10-CM

## 2021-02-21 LAB
HAV IGM SER-ACNC: SIGNIFICANT CHANGE UP
HBV CORE IGM SER-ACNC: SIGNIFICANT CHANGE UP
HBV SURFACE AB SER-ACNC: SIGNIFICANT CHANGE UP
HBV SURFACE AG SER-ACNC: SIGNIFICANT CHANGE UP
HBV SURFACE AG SER-ACNC: SIGNIFICANT CHANGE UP
HCV AB S/CO SERPL IA: 0.08 S/CO — SIGNIFICANT CHANGE UP (ref 0–0.99)
HCV AB SERPL-IMP: SIGNIFICANT CHANGE UP
HIV 1 & 2 AB SERPL IA.RAPID: SIGNIFICANT CHANGE UP

## 2021-02-21 PROCEDURE — 87340 HEPATITIS B SURFACE AG IA: CPT

## 2021-02-21 PROCEDURE — 86706 HEP B SURFACE ANTIBODY: CPT

## 2021-02-21 PROCEDURE — 99283 EMERGENCY DEPT VISIT LOW MDM: CPT

## 2021-02-21 PROCEDURE — 36415 COLL VENOUS BLD VENIPUNCTURE: CPT

## 2021-02-21 PROCEDURE — 86780 TREPONEMA PALLIDUM: CPT

## 2021-02-21 PROCEDURE — 80074 ACUTE HEPATITIS PANEL: CPT

## 2021-02-21 PROCEDURE — 99284 EMERGENCY DEPT VISIT MOD MDM: CPT

## 2021-02-21 PROCEDURE — 86703 HIV-1/HIV-2 1 RESULT ANTBDY: CPT

## 2021-02-21 NOTE — ED ADULT NURSE NOTE - OBJECTIVE STATEMENT
Pt presents to ED s/o needle stick. pt was drawing blood on a pt and stuck his left pointer finger with a dirty needle. Pt punctured his skin.

## 2021-02-21 NOTE — ED PROVIDER NOTE - NSFOLLOWUPCLINICS_GEN_ALL_ED_FT
[facility]  Urgent Care  [address] [place] [address]  Phone: [phone]  Fax: [phone]  Follow Up Time:

## 2021-02-21 NOTE — ED PROVIDER NOTE - CARDIOVASCULAR NEGATIVE STATEMENT, MLM
no chest pain and no edema. no abdominal pain, no bloating, no constipation, no diarrhea, no nausea and no vomiting.

## 2021-02-21 NOTE — ED PROVIDER NOTE - IV ALTEPLASE EXCL REL HIDDEN
"Orthopedic Surgery Progress Note    Subjective:   Using bipap, alert and responding to commands, appears anxious.  Spiked a fever overnight.    Exam:  /70  Pulse 126  Temp 101.9  F (38.8  C)  Resp (!) 35  Ht 1.54 m (5' 0.63\")  Wt 45 kg (99 lb 3.3 oz)  SpO2 100%  BMI 20.66 kg/m2  Gen: vented, sedated  Resp: vented  Extremities:  BUE non swollen.  Sensation grossly intact, wiggles fingers to command.  RLE with wound vac sites dressed with gauze dressings.  Heelpad and toes are frankly firm, gangrenous and blackened. Cutaneous necrotic change to about mid-calf level, becoming more distinct.  No palpable PT or DP pulses.  LLE:  Stable discoloration to ankle dorsum.  No overall limb swelling.  Dopplerable PT and DP pulses. Moves hip flexors/thigh muscles to command, does not move ankle or toe dorsi/plantarflexors to command.  Reports she does have intact sensation in foot dorsum.      Labs:    Recent Labs  Lab 02/27/18  0510 02/26/18  1700 02/26/18  0521 02/25/18  1700   WBC 22.6* 22.4* 27.6* 26.1*   HGB 7.7* 8.2* 8.0* 8.6*   * 451* 404 338       Recent Labs  Lab 02/27/18  0510 02/26/18  1700 02/26/18  0521 02/25/18  1700  02/25/18  0506  02/24/18  0455    141 142 142  --  142  < > 141   POTASSIUM 3.8 3.6 3.4 3.7  < > 2.9*  < > 3.4   CHLORIDE 101 101 101 101  --  100  < > 102   CO2 32 31 32 33*  --  32  < > 32   BUN 45* 31* 33* 33*  --  34*  < > 38*   CR 0.82* 0.66 0.60 0.59  --  0.64  < > 0.79*   GLC 98 111* 107* 120*  --  102*  < > 106*   MAG 1.8  --  1.6  --   --  1.5*  --  1.6   PHOS 2.8*  --  3.0*  --   --  2.6*  --  2.3*   < > = values in this interval not displayed.    Recent Labs  Lab 02/27/18  0510 02/26/18  1704 02/25/18  0506 02/23/18  1915 02/23/18  0507   INR 0.95 0.96 0.98 0.99 0.97   PTT 33 32 33  --  28       Assessment:   10 year old previously healthy female transferred from [facility] bacteremic sepsis and multi-organ failure, who developed increasing R leg swelling and " discoloration.  Based on testing/surgical findings this represents true compartment syndrome of the R thigh and lower leg with ischemic injuries to R>L legs..         S/p R leg mini-fasciotomy (anterior thigh, lower leg ant/lat/post) performed in CVICU on 2/14.  No excitatory muscle found at that time.    No sign of worsening swelling on any other limb at this time based on serial exams.    R foot and possibly calf are becoming necrotic/gangrenous.  This does not represent an acute issue at this time - will continue to monitor and anticipate likely amputation when patient's medical status has stabilized, and plan level of amputation once extent of necrosis has declared itself (this typically takes several weeks).    Will continue monitoring skin changes on L ankle dorsum, no acute intervention needed.      Plan:  -Continue RLE wound cares per gen surg.  -Would defer any amputation unless the non-viable tissue becomes an acute threat to the patient's overall health.  -Therapy plans to be decided pending clinical course.    Joel Orozco MD  PGY-4 Orthopaedic Surgery  [phone]           show

## 2021-02-21 NOTE — ED PROVIDER NOTE - PATIENT PORTAL LINK FT
You can access the FollowMyHealth Patient Portal offered by [facility] by registering at the following website: [email or web address]. By joining [facility]’s FollowMyHealth portal, you will also be able to view your health information using other applications (apps) compatible with our system.

## 2021-02-21 NOTE — ED PROVIDER NOTE - PSH
Acute carpal tunnel syndrome of left wrist  and left thumb trigger finger 2013  S/P tonsillectomy

## 2021-02-21 NOTE — ED PROVIDER NOTE - NSFOLLOWUPINSTRUCTIONS_ED_ALL_ED_FT
Needle Stick Injuries    WHAT YOU NEED TO KNOW:    What do I need to know about needle stick injuries? Needle stick injuries usually happen to healthcare workers in hospitals, clinics, and labs. Needle stick injuries can also happen at home or in the community if needles are not discarded properly. Used needles may have blood or body fluids that carry HIV, the hepatitis B virus (HBV), or the hepatitis C virus (HCV). The virus can spread to a person who gets pricked by a needle used on an infected person.    How do needle stick injuries occur? Needle stick injuries usually happen by accident. [place] may cause injury to you or to someone else if they were not properly discarded after use. An injury can also occur if you do not use gloves to protect your hands while you work with needles.     What should I do if I have a needle stick injury?   •Clean the area immediately. Wash the wound with soap and water.       •Contact your healthcare provider as soon as possible. Your healthcare provider will ask you when the injury happened. He may ask about the type and amount of blood or fluid the needle was exposed to. He will also want to know if the needle was used on a person who has an infection. He may also ask if you have had any vaccines. You will also need blood tests.       How are needle stick injuries treated? Postexposure prophylaxis (PEP) may be needed. PEP is treatment that may protect a person from infection after exposure to another person's body fluids. PEP may be needed if the person whose fluids you were exposed to has a known infection. Do not donate blood, organs, tissues, or semen until your follow-up is completed at 6 months.  •PEP for HBV may include HBV vaccinations or medicine to prevent HBV. This treatment works best if started within 24 hours of exposure.       •PEP for HIV may include 2 or 3 types of medicine to prevent HIV. This treatment works best if started within 72 hours of exposure. Continue treatment for 4 weeks. Practice safe sex to prevent spreading HIV and to prevent pregnancy during the follow-up period. If you are breastfeeding, your healthcare provider may recommend that you stop. Ask your healthcare provider if you can breastfeed.       •PEP for HCV is not available. You will need to be tested for HCV and treated if you were infected.      •A Td vaccine is a booster shot used to help prevent diphtheria and tetanus. The Td booster may be given to adolescents and adults for certain wounds and injuries.      When should I follow up with my healthcare provider? Follow up with your healthcare provider as directed. You will need more blood tests. You will also need to make sure your medicines are working. PEP for HIV often causes side effects. Talk with your healthcare provider about your symptoms. He will need to make sure you are taking the medicine correctly. Write down your questions so you remember to ask them during your visits.    What can I do to prevent needle stick injuries?   •Always use gloves when you handle needles that are exposed to blood or other body fluids. You may want to use 2 pairs of gloves for extra protection.      •Do not recap needles after use. Recapping needles increases your risk for a needle stick.      •Throw away needles in a safe container. A hard container with a lid may prevent accidental needle sticks.      CARE AGREEMENT:    You have the right to help plan your care. Learn about your health condition and how it may be treated. Discuss treatment options with your healthcare providers to decide what care you want to receive. You always have the right to refuse treatment.

## 2021-02-21 NOTE — ED PROVIDER NOTE - OBJECTIVE STATEMENT
61 y/o male with contaminated needle stick at work left index and known source, The patient irrigated his finger with copious wash, no FB.

## 2021-02-22 LAB — T PALLIDUM AB TITR SER: NEGATIVE — SIGNIFICANT CHANGE UP

## 2021-03-25 ENCOUNTER — APPOINTMENT (OUTPATIENT)
Dept: GASTROENTEROLOGY | Facility: CLINIC | Age: 61
End: 2021-03-25
Payer: COMMERCIAL

## 2021-03-25 VITALS
HEIGHT: 68 IN | TEMPERATURE: 97.7 F | DIASTOLIC BLOOD PRESSURE: 90 MMHG | SYSTOLIC BLOOD PRESSURE: 150 MMHG | HEART RATE: 54 BPM | OXYGEN SATURATION: 94 % | WEIGHT: 189 LBS | BODY MASS INDEX: 28.64 KG/M2

## 2021-03-25 DIAGNOSIS — K29.00 ACUTE GASTRITIS W/OUT BLEEDING: ICD-10-CM

## 2021-03-25 DIAGNOSIS — K31.84 GASTROPARESIS: ICD-10-CM

## 2021-03-25 DIAGNOSIS — Z86.010 PERSONAL HISTORY OF COLONIC POLYPS: ICD-10-CM

## 2021-03-25 DIAGNOSIS — R14.0 ABDOMINAL DISTENSION (GASEOUS): ICD-10-CM

## 2021-03-25 PROCEDURE — 99072 ADDL SUPL MATRL&STAF TM PHE: CPT

## 2021-03-25 PROCEDURE — 99213 OFFICE O/P EST LOW 20 MIN: CPT

## 2021-03-25 RX ORDER — FAMOTIDINE 40 MG/1
40 TABLET, FILM COATED ORAL
Qty: 30 | Refills: 2 | Status: DISCONTINUED | COMMUNITY
Start: 2020-09-14 | End: 2021-03-25

## 2021-03-25 NOTE — HISTORY OF PRESENT ILLNESS
[de-identified] : Dr. Ruddy Arenas\par \par Internal medicine - endocrinology, diabetes & metabolism\par \par [address] [place] [address], [place] [address]\par \par [phone]\par \par \par Pleasant 60-year-old gentleman\par \par Long history of GERD\par \par Previous gastroenterologist told him he might of Burnham's\par \par More recent upper endoscopy with me no Burnham's esophagus seen endoscopically or on biopsy\par \par Mild gastritis was found without H. pylori\par \par He was prescribed Pepcid 40 mg a day\par \par Now he is taking Pepcid over-the-counter 20 mg a day with good results\par \par No dysphagia or odynophagia his appetite is good and is no weight loss\par \par Colonoscopy in the past polyp was removed\par \par Most recent colonoscopy normal except for hemorrhoids\par \par No change in bowel movements or blood per rectum

## 2021-03-25 NOTE — ASSESSMENT
[FreeTextEntry1] : Impression\par \par GERD, heartburn\par \par All well controlled currently on antireflux diet and Pepcid over-the-counter 20 mg a day\par \par No evidence of Burnham's on most recent examination, biopsies also negative\par \par History of colon polyps, no polyp on recent examination\par \par Suggest continue antireflux diet\par \par Continue Pepcid over-the-counter 20 mg a day as needed\par \par Colonoscopy in 5 years\par \par Follow-up in 1 year\par \par Call or return anytime sooner as needed

## 2021-03-25 NOTE — CONSULT LETTER
[Dear  ___] : Dear  [unfilled], [Consult Letter:] : I had the pleasure of evaluating your patient, [unfilled]. [Please see my note below.] : Please see my note below. [Consult Closing:] : Thank you very much for allowing me to participate in the care of this patient.  If you have any questions, please do not hesitate to contact me. [Sincerely,] : Sincerely, [FreeTextEntry2] : Dr. Ruddy Arenas\par \par Internal medicine - endocrinology, diabetes & metabolism\par \par [address], [place] [address]\par \par [phone]\par  [FreeTextEntry3] : Royer Odell MD\par

## 2021-03-25 NOTE — REASON FOR VISIT
[FreeTextEntry1] : GERD, questionable history of Burnham's, history of colon polyps, currently doing well on Pepcid over-the-counter

## 2022-06-06 ENCOUNTER — EMERGENCY (EMERGENCY)
Facility: HOSPITAL | Age: 62
LOS: 1 days | Discharge: HOME | End: 2022-06-06
Attending: EMERGENCY MEDICINE | Admitting: EMERGENCY MEDICINE
Payer: COMMERCIAL

## 2022-06-06 VITALS
HEART RATE: 64 BPM | TEMPERATURE: 98 F | DIASTOLIC BLOOD PRESSURE: 90 MMHG | RESPIRATION RATE: 16 BRPM | WEIGHT: 184.97 LBS | OXYGEN SATURATION: 96 % | SYSTOLIC BLOOD PRESSURE: 168 MMHG

## 2022-06-06 DIAGNOSIS — G56.02 CARPAL TUNNEL SYNDROME, LEFT UPPER LIMB: Chronic | ICD-10-CM

## 2022-06-06 DIAGNOSIS — Z90.89 ACQUIRED ABSENCE OF OTHER ORGANS: Chronic | ICD-10-CM

## 2022-06-06 LAB
ALBUMIN SERPL ELPH-MCNC: 3.9 G/DL — SIGNIFICANT CHANGE UP (ref 3.3–5)
ALP SERPL-CCNC: 93 U/L — SIGNIFICANT CHANGE UP (ref 40–120)
ALT FLD-CCNC: 45 U/L — SIGNIFICANT CHANGE UP (ref 12–78)
ANION GAP SERPL CALC-SCNC: 5 MMOL/L — SIGNIFICANT CHANGE UP (ref 5–17)
AST SERPL-CCNC: 39 U/L — HIGH (ref 15–37)
BASOPHILS # BLD AUTO: 0.15 K/UL — SIGNIFICANT CHANGE UP (ref 0–0.2)
BASOPHILS NFR BLD AUTO: 1 % — SIGNIFICANT CHANGE UP (ref 0–2)
BILIRUB SERPL-MCNC: 0.5 MG/DL — SIGNIFICANT CHANGE UP (ref 0.2–1.2)
BUN SERPL-MCNC: 22 MG/DL — SIGNIFICANT CHANGE UP (ref 7–23)
CALCIUM SERPL-MCNC: 9.2 MG/DL — SIGNIFICANT CHANGE UP (ref 8.5–10.1)
CHLORIDE SERPL-SCNC: 108 MMOL/L — SIGNIFICANT CHANGE UP (ref 96–108)
CK SERPL-CCNC: 615 U/L — HIGH (ref 26–308)
CK SERPL-CCNC: 626 U/L — HIGH (ref 26–308)
CO2 SERPL-SCNC: 30 MMOL/L — SIGNIFICANT CHANGE UP (ref 22–31)
CREAT SERPL-MCNC: 1.2 MG/DL — SIGNIFICANT CHANGE UP (ref 0.5–1.3)
D DIMER BLD IA.RAPID-MCNC: <150 NG/ML DDU — SIGNIFICANT CHANGE UP
EGFR: 68 ML/MIN/1.73M2 — SIGNIFICANT CHANGE UP
EOSINOPHIL # BLD AUTO: 0.3 K/UL — SIGNIFICANT CHANGE UP (ref 0–0.5)
EOSINOPHIL NFR BLD AUTO: 2.1 % — SIGNIFICANT CHANGE UP (ref 0–6)
GLUCOSE SERPL-MCNC: 173 MG/DL — HIGH (ref 70–99)
HCT VFR BLD CALC: 44.6 % — SIGNIFICANT CHANGE UP (ref 39–50)
HGB BLD-MCNC: 14.6 G/DL — SIGNIFICANT CHANGE UP (ref 13–17)
IMM GRANULOCYTES NFR BLD AUTO: 1 % — SIGNIFICANT CHANGE UP (ref 0–1.5)
LIDOCAIN IGE QN: 89 U/L — SIGNIFICANT CHANGE UP (ref 73–393)
LYMPHOCYTES # BLD AUTO: 19.9 % — SIGNIFICANT CHANGE UP (ref 13–44)
LYMPHOCYTES # BLD AUTO: 2.89 K/UL — SIGNIFICANT CHANGE UP (ref 1–3.3)
MCHC RBC-ENTMCNC: 31.5 PG — SIGNIFICANT CHANGE UP (ref 27–34)
MCHC RBC-ENTMCNC: 32.7 GM/DL — SIGNIFICANT CHANGE UP (ref 32–36)
MCV RBC AUTO: 96.1 FL — SIGNIFICANT CHANGE UP (ref 80–100)
MONOCYTES # BLD AUTO: 1.22 K/UL — HIGH (ref 0–0.9)
MONOCYTES NFR BLD AUTO: 8.4 % — SIGNIFICANT CHANGE UP (ref 2–14)
NEUTROPHILS # BLD AUTO: 9.82 K/UL — HIGH (ref 1.8–7.4)
NEUTROPHILS NFR BLD AUTO: 67.6 % — SIGNIFICANT CHANGE UP (ref 43–77)
NRBC # BLD: 0 /100 WBCS — SIGNIFICANT CHANGE UP (ref 0–0)
PLATELET # BLD AUTO: 271 K/UL — SIGNIFICANT CHANGE UP (ref 150–400)
POTASSIUM SERPL-MCNC: 4.5 MMOL/L — SIGNIFICANT CHANGE UP (ref 3.5–5.3)
POTASSIUM SERPL-SCNC: 4.5 MMOL/L — SIGNIFICANT CHANGE UP (ref 3.5–5.3)
PROT SERPL-MCNC: 7.2 G/DL — SIGNIFICANT CHANGE UP (ref 6–8.3)
RBC # BLD: 4.64 M/UL — SIGNIFICANT CHANGE UP (ref 4.2–5.8)
RBC # FLD: 13.2 % — SIGNIFICANT CHANGE UP (ref 10.3–14.5)
SODIUM SERPL-SCNC: 143 MMOL/L — SIGNIFICANT CHANGE UP (ref 135–145)
TROPONIN I, HIGH SENSITIVITY RESULT: 108 NG/L — HIGH
TROPONIN I, HIGH SENSITIVITY RESULT: 115.5 NG/L — HIGH
WBC # BLD: 14.53 K/UL — HIGH (ref 3.8–10.5)
WBC # FLD AUTO: 14.53 K/UL — HIGH (ref 3.8–10.5)

## 2022-06-06 PROCEDURE — G1004: CPT

## 2022-06-06 PROCEDURE — 71045 X-RAY EXAM CHEST 1 VIEW: CPT | Mod: 26

## 2022-06-06 PROCEDURE — 99285 EMERGENCY DEPT VISIT HI MDM: CPT

## 2022-06-06 PROCEDURE — 71275 CT ANGIOGRAPHY CHEST: CPT | Mod: 26,MG

## 2022-06-06 PROCEDURE — 93010 ELECTROCARDIOGRAM REPORT: CPT | Mod: 76

## 2022-06-06 PROCEDURE — 74174 CTA ABD&PLVS W/CONTRAST: CPT | Mod: 26,MG

## 2022-06-06 RX ORDER — LABETALOL HCL 100 MG
10 TABLET ORAL ONCE
Refills: 0 | Status: COMPLETED | OUTPATIENT
Start: 2022-06-06 | End: 2022-06-06

## 2022-06-06 RX ORDER — MORPHINE SULFATE 50 MG/1
2 CAPSULE, EXTENDED RELEASE ORAL ONCE
Refills: 0 | Status: DISCONTINUED | OUTPATIENT
Start: 2022-06-06 | End: 2022-06-06

## 2022-06-06 RX ADMIN — MORPHINE SULFATE 2 MILLIGRAM(S): 50 CAPSULE, EXTENDED RELEASE ORAL at 20:32

## 2022-06-06 RX ADMIN — Medication 10 MILLIGRAM(S): at 19:51

## 2022-06-06 RX ADMIN — MORPHINE SULFATE 2 MILLIGRAM(S): 50 CAPSULE, EXTENDED RELEASE ORAL at 20:13

## 2022-06-06 NOTE — ED PROVIDER NOTE - CARE PROVIDER_API CALL
[place] [address] [id number]  Phone: [phone]  Fax: [phone]  Established Patient  Follow Up Time: 1-3 Days    Froylan Delaney)  Cardiovascular Disease  [address] [place] [address]  Phone: [phone]  Fax: [phone]  Established Patient  Follow Up Time: 7-10 Days

## 2022-06-06 NOTE — ED PROVIDER NOTE - OBJECTIVE STATEMENT
60 yo M p/w co chest heaviness which has been waxing / waning x past ~ 2 days. PT was having pain upon ekg in ed. NO SOB. no ELIZALDE / easy fatigue. no exertional sx. no fever/chills. no cough/ uri. pt states occ rad to back. desc as slight pressure. no fall / recent trauma. no recent travel / immob. no agg/allev factors. pt vaccinated for covid. no other acute co or changes.

## 2022-06-06 NOTE — ED PROVIDER NOTE - ENMT, MLM
Airway patent, Nasal mucosa clear. Mouth with normal mucosa. Throat has no vesicles, no oropharyngeal exudates and uvula is midline. no chest wall tend,.

## 2022-06-06 NOTE — ED PROVIDER NOTE - NSFOLLOWUPINSTRUCTIONS_ED_ALL_ED_FT
[employer]® CareNotes®     :  [facility]  	        CHEST PAIN - AfterCare(R) Instructions(ER/ED)     Chest Pain    WHAT YOU NEED TO KNOW:    Chest pain can be caused by a range of conditions, from not serious to life-threatening. Chest pain can be a symptom of a digestive problem, such as acid reflux or a stomach ulcer. An anxiety attack or a strong emotion, such as anger, can also cause chest pain. Infection, inflammation, or a fracture in the bones or cartilage in your chest can cause pain or discomfort. Sometimes chest pain or pressure is caused by poor blood flow to your heart (angina). Chest pain may also be caused by life-threatening conditions such as a heart attack or blood clot in your lungs.    DISCHARGE INSTRUCTIONS:    Call your local emergency number (911 in the [place]) or have someone call if:   •You have any of the following signs of a heart attack: ?Squeezing, pressure, or pain in your chest      ?You may also have any of the following: ?Discomfort or pain in your back, neck, jaw, stomach, or arm      ?Shortness of breath      ?Nausea or vomiting      ?Lightheadedness or a sudden cold sweat    Return to the emergency department if:   •You have chest discomfort that gets worse, even with medicine.      •You cough or vomit blood.      •Your bowel movements are black or bloody.      •You cannot stop vomiting, or it hurts to swallow.      Call your doctor if:   •You have questions or concerns about your condition or care.    Medicines:   •Medicines may be given to treat the cause of your chest pain. Examples include pain medicine, anxiety medicine, or medicines to increase blood flow to your heart.      •Do not take certain medicines without asking your healthcare provider first. These include NSAIDs, herbal or vitamin supplements, and hormones, such as estrogen or progestin.      •Take your medicine as directed. Contact your healthcare provider if you think your medicine is not helping or if you have side effects. Tell him or her if you are allergic to any medicine. Keep a list of the medicines, vitamins, and herbs you take. Include the amounts, and when and why you take them. Bring the list or the pill bottles to follow-up visits. Carry your medicine list with you in case of an emergency.      Healthy living tips: If the cause of your chest pain is known, your healthcare provider will give you specific guidelines to follow. The following are general healthy guidelines:  •Do not smoke. Nicotine and other chemicals in cigarettes and cigars can cause lung and heart damage. Ask your healthcare provider for information if you currently smoke and need help to quit. E-cigarettes or smokeless tobacco still contain nicotine. Talk to your healthcare provider before you use these products.      •Choose a variety of healthy foods as often as possible. Include fresh, frozen, or canned fruits and vegetables. Also include low-fat dairy products, fish, chicken (without skin), and lean meats. Your healthcare provider or a dietitian can help you create meal plans. You may need to avoid certain foods or drinks if your pain is caused by a digestion problem.  Healthy Foods    •Lower your sodium (salt) intake. Limit foods that are high in sodium, such as canned foods, salty snacks, and cold cuts. If you add salt when you cook food, do not add more at the table. Choose low-sodium canned foods as much as possible.       •Drink plenty of water every day. Water helps your body to control your temperature and blood pressure. Ask your healthcare provider how much water you should drink every day.      •Ask about activity. Your healthcare provider will tell you which activities to limit or avoid. Ask when you can drive, return to work, and have sex. Ask about the best exercise plan for you.      •Maintain a healthy weight. Ask your healthcare provider what a healthy weight is for you. Ask him or her to help you create a safe weight loss plan if you are overweight.      •Ask about vaccines you may need. Your healthcare provider can tell you which vaccines you need, and when to get them. The following vaccines help prevent diseases that can become serious for a person with a heart condition:?The influenza (flu) vaccine is given each year. Get a flu vaccine as soon as recommended, usually in September or October.      ?The pneumonia vaccine is usually given every 5 years. Your healthcare provider may recommend the pneumonia vaccine if you are 65 or older.      ?COVID-19 vaccines are given to adults as a shot in 1 or 2 doses. Vaccination is recommended for all adults. A booster (additional) dose is also recommended for all adults. A second booster is recommended for all adults 50 or older and for immunocompromised adults 18 or older. The second booster is also recommended for adults who received the 1-dose vaccine for the first and booster doses. Your healthcare provider can tell you when to get one or both boosters.    Prevent Heart Disease     Follow up with your doctor within 72 hours, or as directed: You may need to return for more tests to find the cause of your chest pain. You may be referred to a specialist, such as a cardiologist or gastroenterologist. Write down your questions so you remember to ask them during your visits.    Follow up with your personal physician in 1-2 days.  Follow up with Dr Delaney (cardiology) as directed for outpatient echocardiography and nuclear stress test.  Atorvastatin 1 tablet (20mg) daily.  Aspirin 81mg daily.    Please return to the Emergency Department immediately for any problems or concerns.

## 2022-06-06 NOTE — ED PROVIDER NOTE - CHPI ED SYMPTOMS NEG
no back pain/no cough/no dizziness/no fever/no shortness of breath/no syncope/no chills/no diaphoresis

## 2022-06-06 NOTE — ED ADULT NURSE NOTE - NSIMPLEMENTINTERV_GEN_ALL_ED
Implemented All Universal Safety Interventions:  [place] to call system. Call bell, personal items and telephone within reach. Instruct patient to call for assistance. Room bathroom lighting operational. Non-slip footwear when patient is off stretcher. Physically safe environment: no spills, clutter or unnecessary equipment. Stretcher in lowest position, wheels locked, appropriate side rails in place.

## 2022-06-06 NOTE — ED PROVIDER NOTE - CARE PROVIDERS DIRECT ADDRESSES
,DirectAddress_Unknown,[email or web address]

## 2022-06-06 NOTE — ED PROVIDER NOTE - PATIENT PORTAL LINK FT
You can access the FollowMyHealth Patient Portal offered by [facility] by registering at the following website: [email or web address]. By joining [employer]’s FollowMyHealth portal, you will also be able to view your health information using other applications (apps) compatible with our system.

## 2022-06-06 NOTE — ED PROVIDER NOTE - PROVIDER TOKENS
PROVIDER:[TOKEN:[[id number]],FOLLOWUP:[1-3 Days],ESTABLISHEDPATIENT:[T]],PROVIDER:[TOKEN:[[id number]],FOLLOWUP:[7-10 Days],ESTABLISHEDPATIENT:[T]]

## 2022-06-06 NOTE — ED PROVIDER NOTE - PROGRESS NOTE DETAILS
Dw Dr Laughlin, agree with CTA, 3 sets CE, will see pt in am. Pt doing well, still with persistent slight chest discomfort. Pt agree with 3 sets, cardio in am. Will monitor. BP improved after labetalol Pt doing well, no events on tele, will monitor. pt NAD, resting comfortably on stretcher with no complaints.  pt provided copy of labs and radiology reports. d/w dr hayes cardiology,  pt cleared for discharge, start atorvastin 20mg daily and aspirin 81mg daily, outpatient echo and nuclear stress in approx 2 weeks.

## 2022-06-06 NOTE — ED ADULT NURSE REASSESSMENT NOTE - NS ED NURSE REASSESS COMMENT FT1
Pt resting comfortably in bed at this time, intermittent left side chest pain resolved at this time after receiving morphine 2mg ivp.    Denies any SOB or resp. distress.  No n/v/d.  Pending 2nd troponin result.

## 2022-06-07 VITALS
DIASTOLIC BLOOD PRESSURE: 82 MMHG | OXYGEN SATURATION: 97 % | RESPIRATION RATE: 18 BRPM | HEART RATE: 56 BPM | SYSTOLIC BLOOD PRESSURE: 183 MMHG | TEMPERATURE: 98 F

## 2022-06-07 LAB
CK SERPL-CCNC: 461 U/L — HIGH (ref 26–308)
TROPONIN I, HIGH SENSITIVITY RESULT: 82.2 NG/L — HIGH

## 2022-06-07 PROCEDURE — 36415 COLL VENOUS BLD VENIPUNCTURE: CPT

## 2022-06-07 PROCEDURE — 71045 X-RAY EXAM CHEST 1 VIEW: CPT

## 2022-06-07 PROCEDURE — 93005 ELECTROCARDIOGRAM TRACING: CPT

## 2022-06-07 PROCEDURE — 96376 TX/PRO/DX INJ SAME DRUG ADON: CPT | Mod: XU

## 2022-06-07 PROCEDURE — 99285 EMERGENCY DEPT VISIT HI MDM: CPT | Mod: 25

## 2022-06-07 PROCEDURE — 96375 TX/PRO/DX INJ NEW DRUG ADDON: CPT | Mod: XU

## 2022-06-07 PROCEDURE — 99285 EMERGENCY DEPT VISIT HI MDM: CPT

## 2022-06-07 PROCEDURE — 84484 ASSAY OF TROPONIN QUANT: CPT

## 2022-06-07 PROCEDURE — 96374 THER/PROPH/DIAG INJ IV PUSH: CPT | Mod: XU

## 2022-06-07 PROCEDURE — 83690 ASSAY OF LIPASE: CPT

## 2022-06-07 PROCEDURE — G1004: CPT

## 2022-06-07 PROCEDURE — 71275 CT ANGIOGRAPHY CHEST: CPT | Mod: MG

## 2022-06-07 PROCEDURE — 85025 COMPLETE CBC W/AUTO DIFF WBC: CPT

## 2022-06-07 PROCEDURE — 74174 CTA ABD&PLVS W/CONTRAST: CPT | Mod: MG

## 2022-06-07 PROCEDURE — 80053 COMPREHEN METABOLIC PANEL: CPT

## 2022-06-07 PROCEDURE — 85379 FIBRIN DEGRADATION QUANT: CPT

## 2022-06-07 PROCEDURE — 82550 ASSAY OF CK (CPK): CPT

## 2022-06-07 RX ORDER — MORPHINE SULFATE 50 MG/1
2 CAPSULE, EXTENDED RELEASE ORAL ONCE
Refills: 0 | Status: DISCONTINUED | OUTPATIENT
Start: 2022-06-07 | End: 2022-06-07

## 2022-06-07 RX ORDER — ATORVASTATIN CALCIUM 80 MG/1
20 TABLET, FILM COATED ORAL ONCE
Refills: 0 | Status: COMPLETED | OUTPATIENT
Start: 2022-06-07 | End: 2022-06-07

## 2022-06-07 RX ORDER — ASPIRIN/CALCIUM CARB/MAGNESIUM 324 MG
81 TABLET ORAL ONCE
Refills: 0 | Status: COMPLETED | OUTPATIENT
Start: 2022-06-07 | End: 2022-06-07

## 2022-06-07 RX ORDER — ATORVASTATIN CALCIUM 80 MG/1
1 TABLET, FILM COATED ORAL
Qty: 30 | Refills: 0
Start: 2022-06-07 | End: 2022-07-06

## 2022-06-07 RX ORDER — ASPIRIN/CALCIUM CARB/MAGNESIUM 324 MG
324 TABLET ORAL ONCE
Refills: 0 | Status: COMPLETED | OUTPATIENT
Start: 2022-06-07 | End: 2022-06-07

## 2022-06-07 RX ADMIN — MORPHINE SULFATE 2 MILLIGRAM(S): 50 CAPSULE, EXTENDED RELEASE ORAL at 02:22

## 2022-06-07 RX ADMIN — ATORVASTATIN CALCIUM 20 MILLIGRAM(S): 80 TABLET, FILM COATED ORAL at 09:48

## 2022-06-07 RX ADMIN — MORPHINE SULFATE 2 MILLIGRAM(S): 50 CAPSULE, EXTENDED RELEASE ORAL at 01:56

## 2022-06-07 RX ADMIN — Medication 81 MILLIGRAM(S): at 10:08

## 2022-06-07 RX ADMIN — Medication 324 MILLIGRAM(S): at 01:40

## 2022-06-07 NOTE — CONSULT NOTE ADULT - SUBJECTIVE AND OBJECTIVE BOX
----CHARTING IN PROGRESS----    [facility]         Yahaira Luz, Watson, Elaina, Qian, Renee Morgan        [phone] (office)    Reason for Consult:    Interval HPI: Patient seen and examined at bedside. No acute events overnight. Patient states the pain comes and goes as he speaks, and is reproducible upon palpation of L anterior chest wall. Denies any pain radiating to the back, palpitations, dizziness, numbness, tingling.     HPI: 62 yo M p/w co chest heaviness which has been waxing / waning x past ~ 2 days. PT was having pain upon ekg in ed. NO SOB. no ELIZALDE / easy fatigue. no exertional sx. no fever/chills. no cough/ uri. pt states occ rad to back. desc as slight pressure. no fall / recent trauma. no recent travel / immob. no agg/allev factors. pt vaccinated for covid. no other acute co or changes.      PAST MEDICAL & SURGICAL HISTORY:  Diabetes          SOCIAL HISTORY: No active tobacco, alcohol or illicit drug use    FAMILY HISTORY:      Home Medications:      MEDICATIONS  (STANDING):    MEDICATIONS  (PRN):      Allergies    No Known Allergies    Intolerances        REVIEW OF SYSTEMS: Negative except as per HPI.    VITAL SIGNS:   Vital Signs Last 24 Hrs  T(C): 36.5 (07 Jun 2022 05:29), Max: 36.8 (06 Jun 2022 21:27)  T(F): 97.7 (07 Jun 2022 05:29), Max: 98.2 (06 Jun 2022 21:27)  HR: 61 (07 Jun 2022 05:29) (59 - 66)  BP: 132/71 (07 Jun 2022 05:29) (132/71 - 190/79)  BP(mean): --  RR: 18 (07 Jun 2022 05:29) (16 - 18)  SpO2: 95% (07 Jun 2022 05:29) (95% - 96%)    I&O's Summary      PHYSICAL EXAM:  Constitutional: NAD, well-developed  HEENT NC/AT, moist mucous membranes  Pulmonary: Non-labored, breath sounds are clear bilaterally, no wheezing, rales or rhonchi  Cardiovascular: +S1, S2, RRR, no murmur  Gastrointestinal: Soft, nontender, nondistended, normoactive bowel sounds  Extremities: No peripheral edema   MSK: Reproducible tenderness to palpation on L anterior chest wall, 4/10 pain   Neurological: Alert, strength and sensitivity are grossly intact  Skin: No obvious lesions/rashes  Psych: Mood & affect appropriate    LABS: All Labs Reviewed:                        14.6   14.53 )-----------( 271      ( 06 Jun 2022 18:10 )             44.6     06 Jun 2022 18:10    143    |  108    |  22     ----------------------------<  173    4.5     |  30     |  1.20     Ca    9.2        06 Jun 2022 18:10    TPro  7.2    /  Alb  3.9    /  TBili  0.5    /  DBili  x      /  AST  39     /  ALT  45     /  AlkPhos  93     06 Jun 2022 18:10      CARDIAC MARKERS ( 07 Jun 2022 05:28 )  x     / x     / 461 U/L / x     / x      CARDIAC MARKERS ( 06 Jun 2022 21:07 )  x     / x     / 615 U/L / x     / x      CARDIAC MARKERS ( 06 Jun 2022 18:10 )  x     / x     / 626 U/L / x     / x             [facility]         Yahaira Luz, Watson, Elaina, Qian, Eddie, Renee        [phone] (office)    Reason for Consult:    Interval HPI: Patient seen and examined at bedside. No acute events overnight. Patient states the pain comes and goes as he speaks, and is reproducible upon palpation of L anterior chest wall. Denies any pain radiating to the back, palpitations, dizziness, numbness, tingling.     HPI: 62 yo M p/w co chest heaviness which has been waxing / waning x past ~ 2 days. PT was having pain upon ekg in ed. NO SOB. no ELIZALDE / easy fatigue. no exertional sx. no fever/chills. no cough/ uri. pt states occ rad to back. desc as slight pressure. no fall / recent trauma. no recent travel / immob. no agg/allev factors. pt vaccinated for covid. no other acute co or changes.      PAST MEDICAL & SURGICAL HISTORY:  Diabetes          SOCIAL HISTORY: No active tobacco, alcohol or illicit drug use    FAMILY HISTORY:      Home Medications:      MEDICATIONS  (STANDING):    MEDICATIONS  (PRN):      Allergies    No Known Allergies    Intolerances        REVIEW OF SYSTEMS: Negative except as per HPI.    VITAL SIGNS:   Vital Signs Last 24 Hrs  T(C): 36.5 (07 Jun 2022 05:29), Max: 36.8 (06 Jun 2022 21:27)  T(F): 97.7 (07 Jun 2022 05:29), Max: 98.2 (06 Jun 2022 21:27)  HR: 61 (07 Jun 2022 05:29) (59 - 66)  BP: 132/71 (07 Jun 2022 05:29) (132/71 - 190/79)  BP(mean): --  RR: 18 (07 Jun 2022 05:29) (16 - 18)  SpO2: 95% (07 Jun 2022 05:29) (95% - 96%)    I&O's Summary      PHYSICAL EXAM:  Constitutional: NAD, well-developed  HEENT NC/AT, moist mucous membranes  Pulmonary: Non-labored, breath sounds are clear bilaterally, no wheezing, rales or rhonchi  Cardiovascular: +S1, S2, RRR, no murmur  Gastrointestinal: Soft, nontender, nondistended, normoactive bowel sounds  Extremities: No peripheral edema   MSK: Reproducible tenderness to palpation on L anterior chest wall, 4/10 pain   Neurological: Alert, strength and sensitivity are grossly intact  Skin: No obvious lesions/rashes  Psych: Mood & affect appropriate    LABS: All Labs Reviewed:                        14.6   14.53 )-----------( 271      ( 06 Jun 2022 18:10 )             44.6     06 Jun 2022 18:10    143    |  108    |  22     ----------------------------<  173    4.5     |  30     |  1.20     Ca    9.2        06 Jun 2022 18:10    TPro  7.2    /  Alb  3.9    /  TBili  0.5    /  DBili  x      /  AST  39     /  ALT  45     /  AlkPhos  93     06 Jun 2022 18:10      CARDIAC MARKERS ( 07 Jun 2022 05:28 )  x     / x     / 461 U/L / x     / x      CARDIAC MARKERS ( 06 Jun 2022 21:07 )  x     / x     / 615 U/L / x     / x      CARDIAC MARKERS ( 06 Jun 2022 18:10 )  x     / x     / 626 U/L / x     / x

## 2022-06-07 NOTE — CONSULT NOTE ADULT - ASSESSMENT
Patient is a 60yo male with PMHx of T2DM presenting to the ED with waxing waning L anterior wall chest pain since Sunday.     # Chest pain   - Likely musculoskeletal given pain reproducible on palpation and when patient speaks   - Trops trended till peak, CK now downtrending   - s/p ASA 324mg, IV Labetalol 10mgx1, Morphine 2mgx2 in the ED  - BP now better controlled   - EKG on admission NSR, 63bpm- No acute changes on repeat EKG compared to previous.  - CT A/P show no dissection or aneurysm   - No meaningful evidence of volume overload.  - Does not follow with cardiology outpatient   - Monitor and replete lytes, keep K>4, Mg>2.  - Other cardiovascular workup will depend on clinical course.  - All other workup per primary team.  - Will continue to follow.    # HTN  - On admission to ED BP elevated to 190/79, HR 61  - Patient admits to having "borderline high" readings in the past   - Does not take any htn meds at home   - s/p IV Labetalol 10mgx1 in ED-- with BP now well controlled   - Will consider starting ______.   - Will recommend to follow up with cardio outpatient after discharge              Patient is a 60yo male with PMHx of T1DM, hypothyroidism presenting to the ED with waxing waning L anterior wall chest pain since Sunday.     # Chest pain   - L anterior chest wall pain- Likely musculoskeletal given pain reproducible on palpation and when changes positioning   - Trops trended till peak, CK now downtrending   - s/p ASA 324mg, IV Labetalol 10mgx1, Morphine 2mgx2 in the ED  - BP now better controlled   - EKG on admission NSR, 63bpm- No acute changes on repeat EKG compared to previous.  - CT A/P and CT angio chest- shows no dissection or aneurysm   - Does not follow with cardiology outpatient   - Given nature of pain reproducible and associated with changes in positioning- likely 2/2 to muscle strain and/or spasm  - Patient is high risk individual 2/2 history of T1DM, recommended patient to schedule outpatient TTE, nuclear stress test  - Started patient on ASA 81mg daily and Lipitor 20mg daily at bedtime given history of T1DM and LE vascular insufficiency   - Patient stable for discharge with outpatient follow up at office     # Elevated troponin   - On admission in ED troponin 108,    - Likely demand in setting of elevated BP and pain in anterior chest wall   - Elevated CK likely due to muscle strain/inflammation in setting of reproducible chest pain   - Troponin trended till peaked   - Patient denies any acute palpitations, only endorses chest pain with palpation and/or movement   - Patient is high risk individual 2/2 history of T1DM, recommended patient to schedule outpatient TTE, nuclear stress test  - Started patient on ASA 81mg daily and Lipitor 20mg daily at bedtime given history of T1DM and LE vascular insufficiency   - Patient stable for discharge with outpatient follow up at office     # HTN  - On admission to ED BP elevated to 190/79, HR 61  - Elevated BP likely in setting of ER visit, pt admits to having "borderline high" readings in the past, but otherwise well controlled   - Does not take any htn meds at home   - s/p IV Labetalol 10mgx1 in ED-- with BP now well controlled   - Discussed with patient to start recording BP readings twice daily and follow up outpatient in 2-3 weeks to further discuss readings and clinical course after TTE and nuclear stress test performed

## 2022-06-07 NOTE — CONSULT NOTE ADULT - ATTENDING COMMENTS
type 1 DM, well controlled  no known heart or vascular disease  active, and jogs regularly  comes with chest discomfort clearly reproducible with movement, and not behaving in anything resembling an anginal pattern  cpk is elevated which suggests a musculoskeletal issue  trop mildly elevated possibly on basis of htn, and small vessel disease from dm, with assoc demand ischemia  despite overall risk his syndrome is unlikely to be cardiac and can be managed conservatively  will start asa 81 and atorva 20 daily  outpt echo and nuc stress  will trend bp bid and we can assess need for bp meds  outpt followup in my office

## 2022-06-09 PROBLEM — E11.9 TYPE 2 DIABETES MELLITUS WITHOUT COMPLICATIONS: Chronic | Status: ACTIVE | Noted: 2022-06-06

## 2022-06-24 ENCOUNTER — NON-APPOINTMENT (OUTPATIENT)
Age: 62
End: 2022-06-24

## 2022-06-29 ENCOUNTER — NON-APPOINTMENT (OUTPATIENT)
Age: 62
End: 2022-06-29

## 2022-06-29 ENCOUNTER — APPOINTMENT (OUTPATIENT)
Dept: CARDIOLOGY | Facility: CLINIC | Age: 62
End: 2022-06-29
Payer: COMMERCIAL

## 2022-06-29 VITALS
SYSTOLIC BLOOD PRESSURE: 171 MMHG | BODY MASS INDEX: 28.49 KG/M2 | WEIGHT: 188 LBS | DIASTOLIC BLOOD PRESSURE: 74 MMHG | OXYGEN SATURATION: 97 % | HEIGHT: 68 IN | HEART RATE: 53 BPM

## 2022-06-29 DIAGNOSIS — R07.89 OTHER CHEST PAIN: ICD-10-CM

## 2022-06-29 DIAGNOSIS — R77.8 OTHER SPECIFIED ABNORMALITIES OF PLASMA PROTEINS: ICD-10-CM

## 2022-06-29 PROCEDURE — 93000 ELECTROCARDIOGRAM COMPLETE: CPT

## 2022-06-29 PROCEDURE — 99214 OFFICE O/P EST MOD 30 MIN: CPT

## 2022-06-29 NOTE — DISCUSSION/SUMMARY
[FreeTextEntry1] : His chest discomfort has resolved.  It is not clear, but I suspect that this was a musculoskeletal issue.  He does have type 1 diabetes, and he also has at least as of today, essential hypertension, suboptimally controlled.\par \par For his blood pressure, we will start valsartan 160 mg daily.  He will have a basic metabolic panel performed in about 2 weeks.\par \par Given his risk factors, chest discomfort and elevated troponin, he will schedule an echocardiogram and a nuclear stress test.  I will be in contact with him to discuss the results.\par \par He will continue to check his blood pressure over the next couple of weeks, and give us a call with his blood pressure numbers.  He will set up a follow-up evaluation with me to have his symptoms and blood pressure reevaluated in about 6 weeks.

## 2022-06-29 NOTE — HISTORY OF PRESENT ILLNESS
[FreeTextEntry1] : Vipul presented to the office today for a follow-up evaluation.  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  He did not carry a diagnosis of hypertension.  He presented to the emergency department June 2022 with chest discomfort and very high blood pressures, associated with mild elevations in his troponin.  I felt that his symptoms were musculoskeletal, and the troponin, though potentially of concern, was likely a demand related issue related to small vessel disease and a very hypertensive response to pain.  We did not start him on blood pressure medication at that time, but recommended close outpatient follow-up with frequent blood pressure measurements at home, and outpatient evaluation to include an echocardiogram and a nuclear stress test.\par \par He presents to the office today having been feeling well.  He does not report reproducible chest discomfort suggestive of angina.  His seemingly musculoskeletal discomfort has resolved.  He denies orthopnea, PND and edema.  He denies palpitations, dizziness and syncope. His blood pressure has been labile, but generally high.  He has been exercising without chest discomfort, but feels somewhat dyspneic if he exercises and his HR goes higher than 150.  He has been compliant with aspirin as well as atorvastatin, both started when he was in the hospital.

## 2022-07-05 ENCOUNTER — APPOINTMENT (OUTPATIENT)
Dept: CARDIOLOGY | Facility: CLINIC | Age: 62
End: 2022-07-05

## 2022-07-05 PROCEDURE — 93306 TTE W/DOPPLER COMPLETE: CPT

## 2022-07-12 ENCOUNTER — NON-APPOINTMENT (OUTPATIENT)
Age: 62
End: 2022-07-12

## 2022-07-13 ENCOUNTER — APPOINTMENT (OUTPATIENT)
Dept: CARDIOLOGY | Facility: CLINIC | Age: 62
End: 2022-07-13

## 2022-07-13 PROCEDURE — A9500: CPT

## 2022-07-13 PROCEDURE — 93015 CV STRESS TEST SUPVJ I&R: CPT

## 2022-07-13 PROCEDURE — 78452 HT MUSCLE IMAGE SPECT MULT: CPT

## 2022-07-18 LAB
ANION GAP SERPL CALC-SCNC: 10 MMOL/L
BUN SERPL-MCNC: 26 MG/DL
CALCIUM SERPL-MCNC: 9.3 MG/DL
CHLORIDE SERPL-SCNC: 105 MMOL/L
CO2 SERPL-SCNC: 25 MMOL/L
CREAT SERPL-MCNC: 1.09 MG/DL
EGFR: 77 ML/MIN/1.73M2
GLUCOSE SERPL-MCNC: 131 MG/DL
POTASSIUM SERPL-SCNC: 4.7 MMOL/L
SODIUM SERPL-SCNC: 140 MMOL/L

## 2022-09-09 ENCOUNTER — NON-APPOINTMENT (OUTPATIENT)
Age: 62
End: 2022-09-09

## 2022-09-09 ENCOUNTER — APPOINTMENT (OUTPATIENT)
Dept: CARDIOLOGY | Facility: CLINIC | Age: 62
End: 2022-09-09

## 2022-09-09 VITALS
OXYGEN SATURATION: 98 % | BODY MASS INDEX: 28.34 KG/M2 | SYSTOLIC BLOOD PRESSURE: 160 MMHG | WEIGHT: 187 LBS | DIASTOLIC BLOOD PRESSURE: 76 MMHG | HEART RATE: 60 BPM | HEIGHT: 68 IN

## 2022-09-09 PROCEDURE — 93000 ELECTROCARDIOGRAM COMPLETE: CPT

## 2022-09-09 PROCEDURE — 99214 OFFICE O/P EST MOD 30 MIN: CPT

## 2022-09-09 NOTE — HISTORY OF PRESENT ILLNESS
[FreeTextEntry1] : Vipul presented to the office today for a follow-up evaluation.  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  He did not carry a diagnosis of hypertension.  He presented to the emergency department June 2022 with chest discomfort and very high blood pressures, associated with mild elevations in his troponin.  I felt that his symptoms were musculoskeletal, and the troponin, though potentially of concern, was likely a demand related issue related to small vessel disease and a very hypertensive response to pain.  We did not start him on blood pressure medication at that time, but recommended close outpatient follow-up with frequent blood pressure measurements at home, and outpatient evaluation to include an echocardiogram and a nuclear stress test.\par \par The echocardiogram was performed July 5, 2022.  This revealed a normal ejection fraction, without significant valve disease.  Nuclear stress testing was performed July 13, 2022.  This revealed no evidence of ischemia.  Because his blood pressure was elevated at the last visit, I started valsartan, which was subsequently increased.\par \par He presents to the office today having been feeling well.  He does not report reproducible chest discomfort suggestive of angina.  His seemingly musculoskeletal discomfort has resolved.  He denies orthopnea, PND and edema.  He denies palpitations, dizziness and syncope. His blood pressure has been labile, but generally controlled, with systolic blood pressures in the range of 120 or 130, and diastolic readings typically in the 50s.

## 2022-09-09 NOTE — DISCUSSION/SUMMARY
[FreeTextEntry1] : His chest discomfort has resolved.  It is not clear, but I suspect that this was a musculoskeletal issue.  He does have type 1 diabetes, as well as essential hypertension.  His blood pressure has been improving of late on the medications that we have started.  At present, he has a systolic blood pressure that at times is above goal, but with diastolic blood pressures in the 50s.  I think I will leave things alone.  He will continue valsartan 320 mg daily, as well as atorvastatin.\par \par I reviewed his recent labs and EKGs, His echocardiogram was performed July 5, 2022.  This revealed a normal ejection fraction, without significant valve disease.  Nuclear stress testing was performed July 13, 2022.  This revealed no evidence of ischemia.   \par \par No additional testing is needed at this time.  I suggest a follow-up in about 6 months.

## 2022-09-09 NOTE — CARDIOLOGY SUMMARY
[de-identified] : June 29, 2022 sinus rhythm, cannot rule out anteroseptal infarct.\par 9/9/22 sinus rhythm, cannot rule out anteroseptal infarct.

## 2022-11-27 ENCOUNTER — EMERGENCY (EMERGENCY)
Facility: HOSPITAL | Age: 62
LOS: 1 days | Discharge: HOME | End: 2022-11-27
Attending: EMERGENCY MEDICINE | Admitting: EMERGENCY MEDICINE
Payer: COMMERCIAL

## 2022-11-27 VITALS
TEMPERATURE: 98 F | WEIGHT: 184.97 LBS | HEART RATE: 66 BPM | OXYGEN SATURATION: 98 % | DIASTOLIC BLOOD PRESSURE: 78 MMHG | HEIGHT: 67 IN | SYSTOLIC BLOOD PRESSURE: 193 MMHG | RESPIRATION RATE: 20 BRPM

## 2022-11-27 DIAGNOSIS — Z90.89 ACQUIRED ABSENCE OF OTHER ORGANS: Chronic | ICD-10-CM

## 2022-11-27 DIAGNOSIS — G56.02 CARPAL TUNNEL SYNDROME, LEFT UPPER LIMB: Chronic | ICD-10-CM

## 2022-11-27 PROCEDURE — 72131 CT LUMBAR SPINE W/O DYE: CPT | Mod: 26,MA

## 2022-11-27 PROCEDURE — 72220 X-RAY EXAM SACRUM TAILBONE: CPT | Mod: 26

## 2022-11-27 PROCEDURE — 99284 EMERGENCY DEPT VISIT MOD MDM: CPT

## 2022-11-27 RX ORDER — KETOROLAC TROMETHAMINE 30 MG/ML
60 SYRINGE (ML) INJECTION ONCE
Refills: 0 | Status: DISCONTINUED | OUTPATIENT
Start: 2022-11-27 | End: 2022-11-27

## 2022-11-27 RX ORDER — VALSARTAN 80 MG/1
0 TABLET ORAL
Qty: 0 | Refills: 0 | DISCHARGE

## 2022-11-27 RX ADMIN — Medication 60 MILLIGRAM(S): at 07:34

## 2022-11-27 NOTE — ED PROVIDER NOTE - CLINICAL SUMMARY MEDICAL DECISION MAKING FREE TEXT BOX
62-year-old male with tailbone pain with questionable trauma will do x-ray, CT pain control most likely coccydynia.

## 2022-11-27 NOTE — ED ADULT NURSE NOTE - PAIN RATING/NUMBER SCALE (0-10): ACTIVITY
Reviewed.  [facility]  Ok to give diflucan 150mg PO X 1, then repeat in 72 hours if still symptomatic.  5

## 2022-11-27 NOTE — ED ADULT NURSE NOTE - OBJECTIVE STATEMENT
patient presents to the er with complaints of coccyx pain which he states started 3 days ago.  At that time, he was sitting in the attic crawl space and scooting around, moving around stuff / cleaning and later that day developed pain.  no noticeable bruising.  he states that he feels the area is swollen.  He was just going to call the orthopedist Monday morning, but he feels that the pain is worsening.  He states that he has been taking Advil over the past 3 days w/o much noticeable improvement.  Pain is worse with sitting.  states he is okay with walking / standing.  He is alert / oriented x4.  No other significant trauma or injury.

## 2022-11-27 NOTE — ED ADULT TRIAGE NOTE - CHIEF COMPLAINT QUOTE
Patient complaining of pain to coccyx area, started about 3 days ago, worsening today. not on blood thinners, no bruising.

## 2022-11-27 NOTE — ED ADULT NURSE NOTE - NSICDXPASTMEDICALHX_GEN_ALL_CORE_FT
PAST MEDICAL HISTORY:  Adhesive capsulitis of shoulder, unspecified laterality     Diabetes     Diabetes mellitus Type 1    Hypothyroidism

## 2022-11-27 NOTE — ED ADULT NURSE REASSESSMENT NOTE - NS ED NURSE REASSESS COMMENT FT1
0734:  medicated patient as per order.  Tolerated well.  He was then taken to radiology via stretcher.  richardson loo.

## 2022-11-27 NOTE — ED PROVIDER NOTE - PATIENT PORTAL LINK FT
You can access the FollowMyHealth Patient Portal offered by [facility] by registering at the following website: [email or web address]. By joining [employer]’s FollowMyHealth portal, you will also be able to view your health information using other applications (apps) compatible with our system.

## 2022-11-27 NOTE — ED PROVIDER NOTE - NSFOLLOWUPINSTRUCTIONS_ED_ALL_ED_FT
Sciatica       Sciatica is pain, weakness, tingling, or loss of feeling (numbness) along the sciatic nerve. The sciatic nerve starts in the lower back and goes down the back of each leg. Sciatica usually goes away on its own or with treatment. Sometimes, sciatica may come back (recur).      What are the causes?    This condition happens when the sciatic nerve is pinched or has pressure put on it. This may be the result of:  •A disk in between the bones of the spine bulging out too far (herniated disk).      •Changes in the spinal disks that occur with aging.      •A condition that affects a muscle in the butt.      •Extra bone growth near the sciatic nerve.      •A break (fracture) of the area between your hip bones (pelvis).      •Pregnancy.       •Tumor. This is rare.        What increases the risk?    You are more likely to develop this condition if you:  •Play sports that put pressure or stress on the spine.      •Have poor strength and ease of movement (flexibility).      •Have had a back injury in the past.      •Have had back surgery.      •Sit for long periods of time.      •Do activities that involve bending or lifting over and over again.      •Are very overweight (obese).        What are the signs or symptoms?    Symptoms can vary from mild to very bad. They may include:•Any of these problems in the lower back, leg, hip, or butt:  •Mild tingling, loss of feeling, or dull aches.      •Burning sensations.      •Sharp pains.         •Loss of feeling in the back of the calf or the sole of the foot.      •Leg weakness.       •Very bad back pain that makes it hard to move.      These symptoms may get worse when you cough, sneeze, or laugh. They may also get worse when you sit or stand for long periods of time.      How is this treated?    This condition often gets better without any treatment. However, treatment may include:  •Changing or cutting back on physical activity when you have pain.      •Doing exercises and stretching.      •Putting ice or heat on the affected area.    •Medicines that help:   •To relieve pain and swelling.       •To relax your muscles.         •Shots (injections) of medicines that help to relieve pain, irritation, and swelling.      •Surgery.        Follow these instructions at home:    Medicines     •Take over-the-counter and prescription medicines only as told by your doctor.    •Ask your doctor if the medicine prescribed to you:  •Requires you to avoid driving or using heavy machinery.    •Can cause trouble pooping (constipation). You may need to take these steps to prevent or treat trouble pooping:  •Drink enough fluids to keep your pee (urine) pale yellow.      •Take over-the-counter or prescription medicines.      •Eat foods that are high in fiber. These include beans, whole grains, and fresh fruits and vegetables.      •Limit foods that are high in fat and sugar. These include fried or sweet foods.            Managing pain                 •If told, put ice on the affected area.  •Put ice in a plastic bag.      •Place a towel between your skin and the bag.      •Leave the ice on for 20 minutes, 2–3 times a day.      •If told, put heat on the affected area. Use the heat source that your doctor tells you to use, such as a moist heat pack or a heating pad.  •Place a towel between your skin and the heat source.      •Leave the heat on for 20–30 minutes.      •Remove the heat if your skin turns bright red. This is very important if you are unable to feel pain, heat, or cold. You may have a greater risk of getting burned.          Activity      •Return to your normal activities as told by your doctor. Ask your doctor what activities are safe for you.      •Avoid activities that make your symptoms worse.    •Take short rests during the day.  •When you rest for a long time, do some physical activity or stretching between periods of rest.      •Avoid sitting for a long time without moving. Get up and move around at least one time each hour.        •Exercise and stretch regularly, as told by your doctor.    • Do not lift anything that is heavier than 10 lb (4.5 kg) while you have symptoms of sciatica.  •Avoid lifting heavy things even when you do not have symptoms.      •Avoid lifting heavy things over and over.      •When you lift objects, always lift in a way that is safe for your body. To do this, you should:  •Bend your knees.      •Keep the object close to your body.      •Avoid twisting.        General instructions     •Stay at a healthy weight.      •Wear comfortable shoes that support your feet. Avoid wearing high heels.      •Avoid sleeping on a mattress that is too soft or too hard. You might have less pain if you sleep on a mattress that is firm enough to support your back.      •Keep all follow-up visits as told by your doctor. This is important.        Contact a doctor if:  •You have pain that:  •Wakes you up when you are sleeping.      •Gets worse when you lie down.      •Is worse than the pain you have had in the past.      •Lasts longer than 4 weeks.        •You lose weight without trying.        Get help right away if:    •You cannot control when you pee (urinate) or poop (have a bowel movement).    •You have weakness in any of these areas and it gets worse:  •Lower back.      •The area between your hip bones.      •Butt.      •Legs.        •You have redness or swelling of your back.      •You have a burning feeling when you pee.        Summary    •Sciatica is pain, weakness, tingling, or loss of feeling (numbness) along the sciatic nerve.      •This condition happens when the sciatic nerve is pinched or has pressure put on it.      •Sciatica can cause pain, tingling, or loss of feeling (numbness) in the lower back, legs, hips, and butt.      •Treatment often includes rest, exercise, medicines, and putting ice or heat on the affected area.      This information is not intended to replace advice given to you by your health care provider. Make sure you discuss any questions you have with your health care provider.      Document Revised: 01/06/2020 Document Reviewed: 01/06/2020    Elsevier Patient Education © 2022 [employer]

## 2022-11-27 NOTE — ED PROVIDER NOTE - OBJECTIVE STATEMENT
62-year-old male with complaints of tailbone pain times few days.  Patient states he was in the crawl space under his house and was sitting for a long time.  Patient states pain is worse now with prolonged sitting.  Patient denies any urinary symptoms, fevers, skin changes.

## 2022-11-27 NOTE — ED ADULT NURSE REASSESSMENT NOTE - NS ED NURSE REASSESS COMMENT FT1
0754:  patient back from radiology.  He thinks that he does feel a little improvement, though he states it was painful to lay on the CT table.  richardson loo.

## 2022-11-30 ENCOUNTER — EMERGENCY (EMERGENCY)
Facility: HOSPITAL | Age: 62
LOS: 1 days | Discharge: HOME | End: 2022-11-30
Attending: EMERGENCY MEDICINE | Admitting: EMERGENCY MEDICINE
Payer: COMMERCIAL

## 2022-11-30 VITALS
SYSTOLIC BLOOD PRESSURE: 160 MMHG | TEMPERATURE: 98 F | HEART RATE: 70 BPM | DIASTOLIC BLOOD PRESSURE: 81 MMHG | RESPIRATION RATE: 17 BRPM | OXYGEN SATURATION: 97 %

## 2022-11-30 VITALS
TEMPERATURE: 98 F | WEIGHT: 184.97 LBS | DIASTOLIC BLOOD PRESSURE: 93 MMHG | RESPIRATION RATE: 16 BRPM | SYSTOLIC BLOOD PRESSURE: 180 MMHG | OXYGEN SATURATION: 98 % | HEIGHT: 67 IN | HEART RATE: 74 BPM

## 2022-11-30 DIAGNOSIS — Z90.89 ACQUIRED ABSENCE OF OTHER ORGANS: Chronic | ICD-10-CM

## 2022-11-30 DIAGNOSIS — G56.02 CARPAL TUNNEL SYNDROME, LEFT UPPER LIMB: Chronic | ICD-10-CM

## 2022-11-30 PROCEDURE — 99283 EMERGENCY DEPT VISIT LOW MDM: CPT | Mod: 25

## 2022-11-30 PROCEDURE — 72131 CT LUMBAR SPINE W/O DYE: CPT | Mod: MA

## 2022-11-30 PROCEDURE — 99284 EMERGENCY DEPT VISIT MOD MDM: CPT | Mod: 25

## 2022-11-30 PROCEDURE — 10080 I&D PILONIDAL CYST SIMPLE: CPT

## 2022-11-30 PROCEDURE — 96372 THER/PROPH/DIAG INJ SC/IM: CPT

## 2022-11-30 PROCEDURE — 72220 X-RAY EXAM SACRUM TAILBONE: CPT

## 2022-11-30 RX ADMIN — Medication 1 TABLET(S): at 19:23

## 2022-11-30 NOTE — ED PROVIDER NOTE - OBJECTIVE STATEMENT
62-year-old male with history of type 1 diabetes presents to the emergency department with abscess to buttock.  Patient explains that he fell onto his buttock a few weeks ago.  Patient came to the emergency department 4 days ago for buttock/coccyx pain.  Patient had x-ray and CT which were negative for fracture.  Patient followed up with orthopedist, was put on steroids and pain medicine.  Patient had MRI this morning, pending results.  Patient went to urgent care today and will then was sent for further evaluation and treatment. Denies fever, chills, chest pain, sob, abd pain, N/V, UE/LE weakness or paresthesias.

## 2022-11-30 NOTE — ED PROVIDER NOTE - PATIENT PORTAL LINK FT
You can access the FollowMyHealth Patient Portal offered by [facility] by registering at the following website: [email or web address]. By joining [employer]’s FollowMyHealth portal, you will also be able to view your health information using other applications (apps) compatible with our system.

## 2022-11-30 NOTE — ED ADULT NURSE NOTE - NSICDXFAMILYHX_GEN_ALL_CORE_FT
FAMILY HISTORY:  FH: stroke, Mother:     Father  Still living? No  Family history of cardiac disorder, Age at diagnosis: Age Unknown    Mother  Still living? No  Family history of bowel disorder, Age at diagnosis: Age Unknown  Family history of COPD (chronic obstructive pulmonary disease), Age at diagnosis: Age Unknown     no concerns

## 2022-11-30 NOTE — ED PROVIDER NOTE - CLINICAL SUMMARY MEDICAL DECISION MAKING FREE TEXT BOX
62-year-old white male presents to the emergency department at [place] complaining of draining wound to the lower back times few days sent here by a local urgent care for further evaluation.  No fever no chills no vomiting no diarrhea no trauma. This case requires evaluation as well as local incision and drainage after local anesthesia patient will need to be discharged on oral antibiotics as well.

## 2022-11-30 NOTE — ED PROVIDER NOTE - CARE PROVIDER_API CALL
Jelena Hawkins  1944     Office/Outpatient Visit    Visit Date: Fri, Jun 12, 2020 10:08 am    Provider: Ambika Alicea MD (Assistant: Jelena Goins MA)    Location: [facility]        Electronically signed by Ambika Alicea MD on  06/12/2020 10:45:44 AM                             Subjective:        CC: Phoenix is a 75 year old White female.  Discuss CT results;         HPI: Phoenix is here today to discuss CT A/P results after CT was obtained to evaluate abdominal pain with poor response to omeprazole.  Labs were unremarkable.  CT came back showing focal thickening of the sigmoid and rectal walls, as well as a mass in the pancreatic tail, a second cystic-appearing lesion in the pancreatic body, and distal gastric wall thickening consistent with possible gastritis.  She also had a peripheral splenic mass.          Small caliber stools.    ROS:     CONSTITUTIONAL:  Negative for fatigue and fever.      EYES:  Negative for blurred vision.      CARDIOVASCULAR:  Negative for chest pain and palpitations.      RESPIRATORY:  Negative for recent cough and dyspnea.      GASTROINTESTINAL:  Positive for abdominal pain, abdominal bloating, constipation and change in stool caliber.   Negative for acid reflux symptoms, dysphagia, diarrhea, heartburn, hematochezia, melena, nausea or vomiting.      MUSCULOSKELETAL:  Negative for arthralgias and myalgias.          Past Medical History / Family History / Social History:         Last Reviewed on 6/12/2020 10:28 AM by Ambika Alicea    Past Medical History:             PREVENTIVE HEALTH MAINTENANCE             BONE DENSITY: was last done 10/9/2019 with the following abnormality noted-- osteopenia improved; on Evista     COLORECTAL CANCER SCREENING: Up to date (colonoscopy q10y; sigmoidoscopy q5y; Cologuard q3y) was last done 10/15/2014, Results are in chart; colonoscopy with normal results     MAMMOGRAM: Done within last 2 years and results in are chart was last  done 10/9/2019 with normal results h/o fibrocystic breast disease does not want anymore         PAST MEDICAL HISTORY         Positive for    Hypertension;     Positive for    Osteoarthritis;         CURRENT MEDICAL PROVIDERS:    Ophthalmologist: Dr. Cedeno    Orthopedist: Dr. 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          Substance Abuse History:     Last Reviewed on 6/12/2020 10:28 AM by Ambika Alicea        Mental Health History:     Last Reviewed on 6/12/2020 10:28 AM by Ambika Alicea        Communicable Diseases (eg STDs):     Last Reviewed on 6/12/2020 10:28 AM by Ambika Alicea        Current Problems:     Last Reviewed on 6/03/2020 10:43 AM by Ambika Alicea    HTN - Essential (primary) hypertension    Other osteoporosis without current pathological fracture    Epigastric abdominal tenderness    Low back pain        Immunizations:     Fluzone vs. High Dose Fluzone 10/1/2016    Td adult 10/20/2018    Prevnar 13 (Pneumococcal PCV 13) 1/27/2016    Fluzone High-Dose pf (>=65 yr) 12/1/2017    Fluzone High-Dose pf (>=65 yr) 10/7/2019    PNEUMOVAX 23 (Pneumococcal PPV23) 1/23/2019        Allergies:     Last Reviewed on 6/03/2020 10:43 AM by Ambika Alicea    No Known Allergies.        Current Medications:     Last Reviewed on 6/03/2020 10:43 AM by Ambika Alicea    raloxifene 60 mg oral tablet [1 tab daily]    Amlodipine  5 mg oral tablet [1 tab daily]    Valsartan 160 mg oral tablet [Take 1 tablet(s) by mouth daily]     Multivitamin/Mineral Supplement     omeprazole 40 mg oral capsule,delayed release (enteric coated) [1 cap daily]        Objective:        Vitals:         Current: 6/12/2020 10:09:25 AM    Ht:  5 ft, 2 in;  Wt: 136.4 lbs;  BMI: 24.9T: 97.8 F (temporal);  BP: 111/71 mm Hg (right arm, sitting);  P: 92 bpm (right arm (BP Cuff), sitting);  sCr: 0.67 mg/dL;  GFR: 67.11        Exams:     PHYSICAL EXAM:     GENERAL:  well developed and nourished; appropriately groomed; in no apparent distress;     EYES: extraocular movements intact; conjunctiva and cornea are normal;     RESPIRATORY: normal respiratory rate and pattern with no distress;     MUSCULOSKELETAL: normal overall tone     NEUROLOGIC: mental status: alert and oriented x 3;     PSYCHIATRIC: appropriate affect and demeanor; normal psychomotor function; normal speech pattern;         Assessment:         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       K29.00   Acute gastritis without bleeding           ORDERS:         Meds Prescribed:       [New Rx] sucralfate 1 gram oral tablet [take 1 tablet (1 gram) by oral route 2 times per day PRN], #60 (sixty) tablets, Refills: 0 (zero)         Procedures Ordered:       REFER  Referral to Specialist or Other Facility  (Send-Out)            REFER  Referral to Specialist or Other Facility  (Send-Out)                      Plan:         Neoplasm of unspecified behavior of digestive systemTootsie initially presented with epigastric pain/tenderness radiating to the back worsened with eating x 6 weeks and accompanied by bloating.  She initially had labwork done including CMP, CBC, lipase and H. pylori that showed a very mild elevation of the lipase, otherwise unremarkable.  She was started on omeprazole empirically but called back less than a week later stating the pain had worsened and migrated to the lower abdomen.  Also now complaining of small caliber stools.  CT A/P was obtained and showed thickening of the distal  gastric mucose consistent with possible gastritis, as well as a pancreatic tail mass and focal thickening of the sigmoid colon as well as the rectum.  All results discussed with the pt and her  today.  She will be started on sucralfate for the possible gastritis and referrals placed to Dr. Olson for upper and lower scopes, as well as to Dr. Nikolai Jacobo for further evaluation of the focal thickening and pancreatic tail mass.  All questions answered today.  She has no other needs at this time.        REFERRALS:  Referral initiated to a general surgeon ( Dr. Maury Olson; for evaluation of gastric and sigmoid colon/rectal thickening found on CT A/P (please include report); to perform EGD; a colonoscopy ) and an oncologist ( Dr. Dr. Nikolai Jacobo; for evaluation of pancreatic mass, sigmoid colon/rectal and gastric thickening/ ).  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           Orders:      37287  Office/outpatient visit; established patient, level 3  (In-House)              K29.00  Acute gastritis without bleeding         ADDENDUMS:      ____________________________________    Addendum: 06/16/2020 08:46 AM - [facility], Team         Visit Note Faxed to:        User Entered Recipient; Number [phone]            Addendum: 08/10/2020 02:50 PM - [facility], Team         Visit Note Faxed to:        User Entered Recipient; Number [phone]                Ankit Manriquez)  Surgery  [address] [place] [address]  Phone: [phone]  Fax: [phone]  Follow Up Time: 1-3 Days

## 2022-11-30 NOTE — ED PROVIDER NOTE - ATTENDING APP SHARED VISIT CONTRIBUTION OF CARE
Examination reveals a fluctuant area measuring approximately 5 x 3 cm in the upper sacral region midline consistent with a draining pilonidal cyst abscess.  I agree with plan of management outlined by PA.

## 2022-11-30 NOTE — ED PROVIDER NOTE - NSFOLLOWUPINSTRUCTIONS_ED_ALL_ED_FT
Return in 48-72 hours for wound check  Take antibiotics as prescribed  Try sitz baths a few times a day  Donut pillow for comfort    Abscess    An abscess is an infected area that contains a collection of pus and debris. It can occur in almost any part of the body and occurs when the tissue gets infection. Symptoms include a painful mass that is red, warm, tender that might break open and HAVE drainage. If your health care provider gave you antibiotics make sure to take the full course and do not stop even if feeling better.     SEEK IMMEDIATE MEDICAL CARE IF YOU HAVE ANY OF THE FOLLOWING SYMPTOMS: chills, fever, muscle aches, or red streaking from the area.

## 2022-12-08 ENCOUNTER — EMERGENCY (EMERGENCY)
Facility: HOSPITAL | Age: 62
LOS: 1 days | Discharge: HOME | End: 2022-12-08
Attending: EMERGENCY MEDICINE | Admitting: EMERGENCY MEDICINE
Payer: COMMERCIAL

## 2022-12-08 VITALS — TEMPERATURE: 99 F

## 2022-12-08 VITALS
RESPIRATION RATE: 18 BRPM | HEART RATE: 102 BPM | DIASTOLIC BLOOD PRESSURE: 83 MMHG | SYSTOLIC BLOOD PRESSURE: 139 MMHG | HEIGHT: 67 IN | TEMPERATURE: 100 F | OXYGEN SATURATION: 97 %

## 2022-12-08 DIAGNOSIS — G56.02 CARPAL TUNNEL SYNDROME, LEFT UPPER LIMB: Chronic | ICD-10-CM

## 2022-12-08 DIAGNOSIS — Z90.89 ACQUIRED ABSENCE OF OTHER ORGANS: Chronic | ICD-10-CM

## 2022-12-08 LAB
FLUAV AG NPH QL: DETECTED
FLUBV AG NPH QL: SIGNIFICANT CHANGE UP
RSV RNA NPH QL NAA+NON-PROBE: SIGNIFICANT CHANGE UP
SARS-COV-2 RNA SPEC QL NAA+PROBE: SIGNIFICANT CHANGE UP

## 2022-12-08 PROCEDURE — 99283 EMERGENCY DEPT VISIT LOW MDM: CPT

## 2022-12-08 PROCEDURE — 87637 SARSCOV2&INF A&B&RSV AMP PRB: CPT

## 2022-12-08 RX ORDER — ACETAMINOPHEN 500 MG
975 TABLET ORAL ONCE
Refills: 0 | Status: COMPLETED | OUTPATIENT
Start: 2022-12-08 | End: 2022-12-08

## 2022-12-08 RX ADMIN — Medication 75 MILLIGRAM(S): at 04:03

## 2022-12-08 RX ADMIN — Medication 975 MILLIGRAM(S): at 02:36

## 2022-12-08 NOTE — ED PROVIDER NOTE - PATIENT PORTAL LINK FT
You can access the FollowMyHealth Patient Portal offered by [facility] by registering at the following website: [email or web address]. By joining [facility]’s FollowMyHealth portal, you will also be able to view your health information using other applications (apps) compatible with our system.

## 2022-12-08 NOTE — ED ADULT NURSE NOTE - OBJECTIVE STATEMENT
Pt presented to ED c/o congestion x few days and difficulty sleeping, rapid covid negative at home.  Denies any chest pain or SOB.  No n/v/d.  Afebrile.  Maintain comfort and safety.

## 2022-12-08 NOTE — ED PROVIDER NOTE - OBJECTIVE STATEMENT
62-year-old male complaining about several days of cough nasal congestion chest congestion had a negative home COVID test.  Positive sick contacts at home.  Noted to have low-grade fever 100.2.  No meds taken prior to arrival.  Denies any shortness of breath difficulty breathing.
no concerns

## 2022-12-08 NOTE — ED ADULT TRIAGE NOTE - CHIEF COMPLAINT QUOTE
pt reports congestion for the last couple of days, difficulty sleeping, rapid covid negative at home

## 2022-12-08 NOTE — ED PROVIDER NOTE - NSFOLLOWUPINSTRUCTIONS_ED_ALL_ED_FT
1) Follow-up with your Primary Medical Doctor or referred doctor. Call today / next business day for prompt follow-up.  2) Return to Emergency room for any worsening or persistent pain, weakness, fever, or any other concerning symptoms.  3) See attached instruction sheets for additional information, including information regarding signs and symptoms to look out for, reasons to seek immediate care and other important instructions.  4) Follow-up with any specialists as discussed / noted as well.   5) Tylenol/advil every 6 hours for fever  6) Tamiflu twice a day for 5 days.      Influenza, also called "the flu," is a viral infection that mainly affects the respiratory tract. This includes the lungs, nose, and throat. The flu spreads easily from person to person (is contagious). It causes common cold symptoms, along with high fever and body aches.      What are the causes?    This condition is caused by the influenza virus. You can get the virus by:  •Breathing in droplets that are in the air from an infected person's cough or sneeze.      •Touching something that has the virus on it (has been contaminated) and then touching your mouth, nose, or eyes.        What increases the risk?    The following factors may make you more likely to get the flu:  •Not washing or sanitizing your hands often.      •Having close contact with many people during cold and flu season.      •Touching your mouth, eyes, or nose without first washing or sanitizing your hands.      •Not getting an annual flu shot.      You may have a higher risk for the flu, including serious problems, such as a lung infection (pneumonia), if you:  •Are older than 65.      •Are pregnant.      •Have a weakened disease-fighting system (immune system). This includes people who have HIV or AIDS, are on chemotherapy, or are taking medicines that reduce (suppress) the immune system.      •Have a long-term (chronic) illness, such as heart disease, kidney disease, diabetes, or lung disease.      •Have a liver disorder.      •Are severely overweight (morbidly obese).      •Have anemia.      •Have asthma.        What are the signs or symptoms?    Symptoms of this condition usually begin suddenly and last 4–14 days. These may include:  •Fever and chills.      •Headaches, body aches, or muscle aches.      •Sore throat.      •Cough.      •Runny or stuffy (congested) nose.      •Chest discomfort.      •Poor appetite.      •Weakness or fatigue.      •Dizziness.      •Nausea or vomiting.        How is this diagnosed?    This condition may be diagnosed based on:  •Your symptoms and medical history.      •A physical exam.       •Swabbing your nose or throat and testing the fluid for the influenza virus.        How is this treated?    If the flu is diagnosed early, you can be treated with antiviral medicine that is given by mouth (orally) or through an IV. This can help reduce how severe the illness is and how long it lasts.    Taking care of yourself at home can help relieve symptoms. Your health care provider may recommend:  •Taking over-the-counter medicines.      •Drinking plenty of fluids.      In many cases, the flu goes away on its own. If you have severe symptoms or complications, you may be treated in a hospital.      Follow these instructions at home:    Activity     •Rest as needed and get plenty of sleep.      •Stay home from work or school as told by your health care provider. Unless you are visiting your health care provider, avoid leaving home until your fever has been gone for 24 hours without taking medicine.      Eating and drinking     •Take an oral rehydration solution (ORS). This is a drink that is sold at pharmacies and retail stores.      •Drink enough fluid to keep your urine pale yellow.      •Drink clear fluids in small amounts as you are able. Clear fluids include water, ice chips, fruit juice mixed with water, and low-calorie sports drinks.      •Eat bland, easy-to-digest foods in small amounts as you are able. These foods include bananas, applesauce, rice, lean meats, toast, and crackers.      •Avoid drinking fluids that contain a lot of sugar or caffeine, such as energy drinks, regular sports drinks, and soda.      •Avoid alcohol.      •Avoid spicy or fatty foods.        General instructions                   •Take over-the-counter and prescription medicines only as told by your health care provider.    •Use a cool mist humidifier to add humidity to the air in your home. This can make it easier to breathe.  •When using a cool mist humidifier, clean it daily. Empty the water and replace it with clean water.        •Cover your mouth and nose when you cough or sneeze.      •Wash your hands with soap and water often and for at least 20 seconds, especially after you cough or sneeze. If soap and water are not available, use alcohol-based hand .      •Keep all follow-up visits. This is important.        How is this prevented?     •Get an annual flu shot. This is usually available in late summer, fall, or winter. Ask your health care provider when you should get your flu shot.      •Avoid contact with people who are sick during cold and flu season. This is generally fall and winter.        Contact a health care provider if:    •You develop new symptoms.    •You have:  •Chest pain.      •Diarrhea.      •A fever.        •Your cough gets worse.      •You produce more mucus.      •You feel nauseous or you vomit.        Get help right away if you:    •Develop shortness of breath or have difficulty breathing.      •Have skin or nails that turn a bluish color.      •Have severe pain or stiffness in your neck.      •Develop a sudden headache or sudden pain in your face or ear.      •Cannot eat or drink without vomiting.      These symptoms may represent a serious problem that is an emergency. Do not wait to see if the symptoms will go away. Get medical help right away. Call your local emergency services (911 in the [place] Do not drive yourself to the hospital.       Summary    •Influenza, also called "the flu," is a viral infection that primarily affects your respiratory tract.      •Symptoms of the flu usually begin suddenly and last 4–14 days.      •Getting an annual flu shot is the best way to prevent getting the flu.      •Stay home from work or school as told by your health care provider. Unless you are visiting your health care provider, avoid leaving home until your fever has been gone for 24 hours without taking medicine.      •Keep all follow-up visits. This is important.      This information is not intended to replace advice given to you by your health care provider. Make sure you discuss any questions you have with your health care provider.

## 2022-12-08 NOTE — ED PROVIDER NOTE - NS ED ROS FT
Constitutional: + Fever, - Chills, - Anorexia, - Fatigue, - Night sweats  Eyes: - Discharge, - Irritation, - Redness, - Visual changes, - Light sensitivity, - Pain  EARS: - Ear Pain, - Tinnitus, - Decreased hearing  NOSE: + Congestion, - Epistaxis  MOUTH/THROAT: - Vocal Changes, - Drooling, - Sore throat  NECK: - Lumps, - Stiffness, - Pain  CV: - Palpitations, - Chest Pain, - Edema, - Syncope  RESP:  +Cough, - Shortness of Breath, - Dyspnea on Exertion, - Trouble speaking, - Pleuritic pain - Wheezing  GI: - Diarrhea, - Constipation, - Bloody stools, - Nausea, - Vomiting, - Abdominal Pain  : - Dysuria, -Frequency, - Hematuria, - Hesitancy, - Incontinence, - Saddle Anesthesia, - Abnormal discharge  MSK: - Myalgias, - Arthralgias, - Weakness, - Deformities, - Injuries  SKIN: - Color change, - Rash, - Swelling, - Ecchymosis, - Abrasion, - laceration  NEURO: - Change in behavior, - Dec. Alertness, - Headache, - Dizziness, - Change in speech, - Weakness, - Seizure-like activity, - Difficulty ambulating

## 2022-12-19 ENCOUNTER — NON-APPOINTMENT (OUTPATIENT)
Age: 62
End: 2022-12-19

## 2022-12-20 ENCOUNTER — APPOINTMENT (OUTPATIENT)
Dept: WOUND CARE | Facility: HOSPITAL | Age: 62
End: 2022-12-20

## 2022-12-20 ENCOUNTER — OUTPATIENT (OUTPATIENT)
Dept: OUTPATIENT SERVICES | Facility: HOSPITAL | Age: 62
LOS: 1 days | Discharge: HOME | End: 2022-12-20
Payer: COMMERCIAL

## 2022-12-20 VITALS
WEIGHT: 185 LBS | BODY MASS INDEX: 29.03 KG/M2 | HEART RATE: 68 BPM | DIASTOLIC BLOOD PRESSURE: 79 MMHG | TEMPERATURE: 98.4 F | SYSTOLIC BLOOD PRESSURE: 169 MMHG | HEIGHT: 67 IN | RESPIRATION RATE: 18 BRPM | OXYGEN SATURATION: 98 %

## 2022-12-20 DIAGNOSIS — G56.02 CARPAL TUNNEL SYNDROME, LEFT UPPER LIMB: Chronic | ICD-10-CM

## 2022-12-20 DIAGNOSIS — Z90.89 ACQUIRED ABSENCE OF OTHER ORGANS: Chronic | ICD-10-CM

## 2022-12-20 DIAGNOSIS — Z87.09 PERSONAL HISTORY OF OTHER DISEASES OF THE RESPIRATORY SYSTEM: ICD-10-CM

## 2022-12-20 DIAGNOSIS — Z91.81 HISTORY OF FALLING: ICD-10-CM

## 2022-12-20 DIAGNOSIS — Z86.16 PERSONAL HISTORY OF COVID-19: ICD-10-CM

## 2022-12-20 DIAGNOSIS — L89.150 PRESSURE ULCER OF SACRAL REGION, UNSTAGEABLE: ICD-10-CM

## 2022-12-20 DIAGNOSIS — T81.89XD OTHER COMPLICATIONS OF PROCEDURES, NOT ELSEWHERE CLASSIFIED, SUBSEQUENT ENCOUNTER: ICD-10-CM

## 2022-12-20 DIAGNOSIS — Z82.5 FAMILY HISTORY OF ASTHMA AND OTHER CHRONIC LOWER RESPIRATORY DISEASES: ICD-10-CM

## 2022-12-20 PROCEDURE — G0463: CPT

## 2022-12-20 PROCEDURE — 99203 OFFICE O/P NEW LOW 30 MIN: CPT

## 2022-12-20 RX ORDER — LEVOTHYROXINE SODIUM 100 UG/1
100 TABLET ORAL DAILY
Refills: 0 | Status: ACTIVE | COMMUNITY
Start: 2019-07-25

## 2022-12-20 RX ORDER — ASPIRIN ENTERIC COATED TABLETS 81 MG 81 MG/1
81 TABLET, DELAYED RELEASE ORAL
Refills: 0 | Status: COMPLETED | COMMUNITY
End: 2022-12-20

## 2022-12-20 RX ORDER — INSULIN LISPRO 100 [IU]/ML
100 INJECTION, SOLUTION INTRAVENOUS; SUBCUTANEOUS
Qty: 60 | Refills: 0 | Status: ACTIVE | COMMUNITY
Start: 2019-08-19

## 2022-12-20 NOTE — REVIEW OF SYSTEMS
[Negative] : Heme/Lymph [FreeTextEntry5] : hypertension,hyperlipidemia [FreeTextEntry7] : gastritis, IBS, [de-identified] : wound over coccyx area [de-identified] : DM1, hypothyroid

## 2022-12-20 NOTE — PHYSICAL EXAM
[4 x 4] : 4 x 4  [Normal Breath Sounds] : Normal breath sounds [Normal Heart Sounds] : normal heart sounds [Alert] : alert [Oriented to Person] : oriented to person [Oriented to Place] : oriented to place [Oriented to Time] : oriented to time [Anxious] : anxious [de-identified] : WD WN 63 Y/O white male in NAD [de-identified] : TROY [place] intact [de-identified] : small wound in coccyx area in state of resolution, no signs of infection noted [FreeTextEntry1] : Midline Coccyx [FreeTextEntry2] : 0.8 [FreeTextEntry3] : 0.6 [FreeTextEntry4] : 0.3 [de-identified] : small serous [de-identified] : none [de-identified] : 20% [de-identified] : 80% [de-identified] : none [de-identified] : none [de-identified] : Allevyn Ag Foam Border [de-identified] : Mechanically cleansed with sterile gauze and normal saline 0.9%\par Dry Dressing\par Medipore tape. [TWNoteComboBox5] : No [TWNoteComboBox6] : Traumatic [de-identified] : No [de-identified] : Normal [de-identified] : None [de-identified] : Daily [de-identified] : Primary Dressing

## 2022-12-20 NOTE — HISTORY OF PRESENT ILLNESS
[FreeTextEntry1] : MABEL DUGAN is being seen for a initial nursing assessment visit. Pt presents to the wound clinic with a wound of his coccyx. Pt relates the the wound ideology is traumatic from a fall. \par Pt had previous Xray's & MRI done to r/o fractures. \par Pt was prescribed oral tramadol & prednisone, and muscle relaxers for pain management by Orthopedist.\par Pt relates the wound happened the week of Thanksgiving. Patient accompanied by Self. \par

## 2022-12-20 NOTE — PLAN
[FreeTextEntry1] : off load area\par cleanse daily\par Allevyn Ag Daily/PRN\par return 2 weeks\par 30 minutes in review,evaluation and treatment planning\par

## 2022-12-20 NOTE — ASSESSMENT
[] : Yes [Verbal] : Verbal [Written] : Written [Demo] : Demo [Patient] : Patient [Good - alert, interested, motivated] : Good - alert, interested, motivated [Demonstrates independently] : demonstrates independently [Dressing changes] : dressing changes [Skin Care] : skin care [Pressure relief] : pressure relief [Signs and symptoms of infection] : sign and symptoms of infection [Nutrition] : nutrition [How and When to Call] : how and when to call [Patient responsibility to plan of care] : patient responsibility to plan of care [Glycemic Control] : glycemic control [FreeTextEntry2] : Infection prevention \par Wound care (dressing changes)\par Maintain optimal skin integrity to high pressure areas\par Nutrition and wound healing\par Pressure relief/ Pressure redistribution\par Offloading the stress on skin structures and decreasing potential pathologic biomechanical influences.\par Frequent turning and repositioning q 2 hrs. [FreeTextEntry3] : Initial Visit [FreeTextEntry4] : Pt able to perform own dressing changes competently.\par F/u in 2 weeks.

## 2022-12-22 DIAGNOSIS — K58.9 IRRITABLE BOWEL SYNDROME WITHOUT DIARRHEA: ICD-10-CM

## 2022-12-22 DIAGNOSIS — Z79.899 OTHER LONG TERM (CURRENT) DRUG THERAPY: ICD-10-CM

## 2022-12-22 DIAGNOSIS — Z86.16 PERSONAL HISTORY OF COVID-19: ICD-10-CM

## 2022-12-22 DIAGNOSIS — T81.89XD OTHER COMPLICATIONS OF PROCEDURES, NOT ELSEWHERE CLASSIFIED, SUBSEQUENT ENCOUNTER: ICD-10-CM

## 2022-12-22 DIAGNOSIS — K31.84 GASTROPARESIS: ICD-10-CM

## 2022-12-22 DIAGNOSIS — Z87.19 PERSONAL HISTORY OF OTHER DISEASES OF THE DIGESTIVE SYSTEM: ICD-10-CM

## 2022-12-22 DIAGNOSIS — Z83.79 FAMILY HISTORY OF OTHER DISEASES OF THE DIGESTIVE SYSTEM: ICD-10-CM

## 2022-12-22 DIAGNOSIS — E78.5 HYPERLIPIDEMIA, UNSPECIFIED: ICD-10-CM

## 2022-12-22 DIAGNOSIS — R10.9 UNSPECIFIED ABDOMINAL PAIN: ICD-10-CM

## 2022-12-22 DIAGNOSIS — Z98.890 OTHER SPECIFIED POSTPROCEDURAL STATES: ICD-10-CM

## 2022-12-22 DIAGNOSIS — E03.9 HYPOTHYROIDISM, UNSPECIFIED: ICD-10-CM

## 2022-12-22 DIAGNOSIS — Z87.09 PERSONAL HISTORY OF OTHER DISEASES OF THE RESPIRATORY SYSTEM: ICD-10-CM
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
## 2022-12-22 DIAGNOSIS — Z82.5 FAMILY HISTORY OF ASTHMA AND OTHER CHRONIC LOWER RESPIRATORY DISEASES: ICD-10-CM

## 2022-12-22 DIAGNOSIS — E10.43 TYPE 1 DIABETES MELLITUS WITH DIABETIC AUTONOMIC (POLY)NEUROPATHY: ICD-10-CM
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
## 2022-12-22 DIAGNOSIS — R14.0 ABDOMINAL DISTENSION (GASEOUS): ICD-10-CM

## 2022-12-22 DIAGNOSIS — Z91.81 HISTORY OF FALLING: ICD-10-CM

## 2022-12-22 DIAGNOSIS — Z86.010 PERSONAL HISTORY OF COLONIC POLYPS: ICD-10-CM

## 2022-12-22 DIAGNOSIS — K29.00 ACUTE GASTRITIS WITHOUT BLEEDING: ICD-10-CM

## 2022-12-22 DIAGNOSIS — I10 ESSENTIAL (PRIMARY) HYPERTENSION: ICD-10-CM

## 2022-12-27 NOTE — H&P PST ADULT - DOES PATIENT HAVE ADVANCE DIRECTIVE
ACUTE REHAB UNIT  SPEECH/LANGUAGE PATHOLOGY      [x] Daily  [x] Weekly Care Conference Note  [] Discharge    Patient:Bianca Engel      GFK:[id number]  MTL:[id number]  Rehab Dx/Hx: S/P excision of acoustic neuroma [Z98.890, P35.183]    Precautions: [x] Aspiration  [x] Fall risk  [] Sternal  [] Seizure [] Hip  [] Weight Bearing [] Other  ST Dx: [] Aphasia  [x] Dysarthria  [] Apraxia   [x] Oropharyngeal dysphagia [x] Cognitive Impairment  [] Other:   Date of Admit: 12/22/2022  Room #: 3101/3101-01  Date: 12/27/2022          Current Diet Order:ADULT DIET; Dysphagia - Soft and Bite Sized   Recommended Form of Meds: PO  Compensatory Swallowing Strategies : Alternate solids and liquids, Eat/Feed slowly, Upright as possible for all oral intake, Check for pocketing of food on the Right, Lingual sweep, Small bites/sips      MBSS 12/1/22:  Oral Phase  Moderate dysfunction characterized by right-sided oral weakness with reduced labial seal and coordination, resulting in anterior loss of liquid via tsp and inability to use straw when placed on right or midline. For left-sided straw placement, pt able to create appropriate seal/suction. Prolonged mastication of soft solids, with slowed a-p transit, mild stasis. Pharyngeal Phase  Grossly WFL. Overall timely swallow initiation with appropriate hyolaryngeal mechanics. Single instance trace flash (self clearing) penetration of thins when straw was placed on right side. Otherwise good airway protection throughout with no aspiration. No significant stasis.   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 Date: 12/27/2022       Tx session 1 Tx session 2 Care Conference   Total Timed Code Min 0  30    Total Treatment Minutes 30 30    Individual Treatment Minutes 30 30    Group Treatment Minutes 0 0    Co-Treat Minutes 0 0    Brief Exception: N/A N/A    Pain None indicated at this time. None indicated at this time    Pain Intervention: [] RN notified  [] Repositioned  [] Intervention offered and patient declined  [x] N/A  [] Other: [] RN notified  [] Repositioned  [] Intervention offered and patient declined  [x] N/A  [] Other:    Subjective:     Pt was visited sitting upright in bedside chair and was agreeable to therapy this date. She again endorsed that she is not interested in advancing her diet to regular consistency solids at this time. Pt was visited sitting upright in bedside chair and was agreeable to therapy. Objective / Goals:      Pt will tolerate trials of increased texture in order to advance to least restrictive diet consistency. Pt declines this to be a goal at this time. No instances of anterior loss noted this date. Pt verbalized that she was trying to be more careful about the amount of food she is putting on her spoon and where she is placing the food in her mouth. She independently utilized a lingual sweep and liquid flush that cleared all residue at the end of her meal.  Pt was educated to reduction in care plan due to dysphagia goals being met. She verbalized understanding. Goal not targeted this session. GOAL MET   Patient will independently demonstrate understanding of swallowing concerns, compensatory strategies, and  Pt independently verbalized and utilized safe swallow compensatory strategies. Goal not targeted this session. GOAL MET   Pt will maintain sustained attention to graded task with min cues.  Adequate attention between meal and conversational turns. Pt was able to sustain attention through multiple deductive reasoning tasks and informal conversational turns. At times pt demonstrated tangential speech but suspect this may be baseline. GOAL MET   NEW GOAL:  Pt will accurately complete higher level executive functioning tasks with min cues. Goal not directly targeted this session. Pt completed two different higher level deductive reasoning tasks with 100% accuracy min cues. Excellent attention to detail. NEW GOAL- Continue to Target   Patient will participate in ongoing assessment of cognitive-linguistic skills with additional goals to be established as appropriate. Goal not targeted this session. Goal not targeted this session. Continue to Target         Other areas targeted:      Education:   Pt was educated to rational for tx and to progress on current goals. Pt was re-educated to rational for each tx task and to reduction in plan of care. Safety Devices: [x] Call light within reach  [x] Chair alarm activated and connected to nurse call light system  [] Bed alarm activated   [] Other: [x] Call light within reach  [x] Chair alarm activated and connected to nurse call light system  [] Bed alarm activated  [] Other:     Assessment: Pt continues to present with a mild dysarthria secondary to her facial paralysis however her comprehensibility appears unaffected. Pt independently utilized safe swallow strategies this date without any anterior loss throughout her meal. Cognition appears to be intact but should check higher level executive functioning as pt would like to return to work. New goal added. See above. Plan: Pt met dysphagia goals therefore plan of care was decreased to 30 minutes for cognition only.       Interventions used this date:  [] Speech/Language Treatment  [] Instruction in HEP  [x] Dysphagia Treatment [x] Cognitive Treatment   [] Other:    Discharge recommendations:  [x] Home independently  [] Home with assistance []  24 hour supervision  [] ECF [] Other  Continued Tx Upon Discharge: ? [x] Yes    [] No    [] TBD based on progress while on ARU     [] Vital Stim indicated     [] Other:   Estimated discharge date: 01/01/2023    Electronically signed by:  Viridiana Dominguez, [id number] [facility]; [id number]  Speech-Language Pathologist No

## 2023-01-27 NOTE — H&P PST ADULT - NSICDXPROBLEM_GEN_ALL_CORE_FT
Quality 154 Part B: Falls: Risk Screening (Should Be Reported With Measure 155.): Patient screened for future fall risk; documentation of no falls in the past year or only one fall without injury in the past year Quality 226: Preventive Care And Screening: Tobacco Use: Screening And Cessation Intervention: Patient screened for tobacco use and is an ex/non-smoker Quality 155 (Denominator): Falls Plan Of Care: Plan of Care not Documented, Reason not Otherwise Specified Quality 154 Part A: Falls: Risk Assessment (Should Be Reported With Measure 155.): Falls risk assessment completed and documented in the past 12 months. Detail Level: Detailed Quality 431: Preventive Care And Screening: Unhealthy Alcohol Use - Screening: Patient identified as an unhealthy alcohol user when screened for unhealthy alcohol use using a systematic screening method and received brief counseling Quality 47: Advance Care Plan: Advance care planning not documented, reason not otherwise specified. Quality 110: Preventive Care And Screening: Influenza Immunization: Influenza Immunization previously received during influenza season PROBLEM DIAGNOSES  Problem: Right carpal tunnel syndrome  Assessment and Plan: PROBLEM DIAGNOSES  Problem: Right carpal tunnel syndrome  Assessment and Plan: Right carpal tunnel release

## 2023-03-06 ENCOUNTER — APPOINTMENT (OUTPATIENT)
Dept: CARDIOLOGY | Facility: CLINIC | Age: 63
End: 2023-03-06

## 2023-06-01 ENCOUNTER — APPOINTMENT (OUTPATIENT)
Dept: CARDIOLOGY | Facility: CLINIC | Age: 63
End: 2023-06-01

## 2023-06-13 ENCOUNTER — NON-APPOINTMENT (OUTPATIENT)
Age: 63
End: 2023-06-13

## 2023-06-14 ENCOUNTER — APPOINTMENT (OUTPATIENT)
Dept: CARDIOLOGY | Facility: CLINIC | Age: 63
End: 2023-06-14
Payer: COMMERCIAL

## 2023-06-14 ENCOUNTER — NON-APPOINTMENT (OUTPATIENT)
Age: 63
End: 2023-06-14

## 2023-06-14 VITALS
WEIGHT: 190 LBS | OXYGEN SATURATION: 96 % | HEIGHT: 67 IN | DIASTOLIC BLOOD PRESSURE: 78 MMHG | HEART RATE: 59 BPM | SYSTOLIC BLOOD PRESSURE: 164 MMHG | BODY MASS INDEX: 29.82 KG/M2

## 2023-06-14 PROCEDURE — 99214 OFFICE O/P EST MOD 30 MIN: CPT

## 2023-06-14 PROCEDURE — 93000 ELECTROCARDIOGRAM COMPLETE: CPT

## 2023-06-14 NOTE — CARDIOLOGY SUMMARY
[de-identified] : June 29, 2022 sinus rhythm, cannot rule out anteroseptal infarct.\par 9/9/22 sinus rhythm, cannot rule out anteroseptal infarct.\par June 14, 2023 sinus rhythm cannot rule out anteroseptal infarct

## 2023-06-14 NOTE — DISCUSSION/SUMMARY
[FreeTextEntry1] : His chest discomfort has resolved.  It is not clear, but I suspect that this was a musculoskeletal issue.  He does have type 1 diabetes, as well as essential hypertension.  Although his blood pressure has been improving, and now seems as if he has plateaued.  In addition, he has been inadvertently taking 640 mg of valsartan, instead of 320.  We will reduce the dose down to 320 mg daily, which is of course the maximum.  We will add amlodipine 5 mg daily.  He will check his blood pressure regularly, and send us a list of blood pressure readings in several weeks.  We can make an interim adjustment in medications.  I will plan on seeing him in about 6 months, or earlier if needed.\par \par I reviewed his recent labs and EKGs, His echocardiogram was performed July 5, 2022.  This revealed a normal ejection fraction, without significant valve disease.  Nuclear stress testing was performed July 13, 2022.  This revealed no evidence of ischemia.

## 2023-06-14 NOTE — HISTORY OF PRESENT ILLNESS
[FreeTextEntry1] : Vipul presented to the office today for a follow-up evaluation.  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  He did not carry a diagnosis of hypertension.  He presented to the emergency department June 2022 with chest discomfort and very high blood pressures, associated with mild elevations in his troponin.  I felt that his symptoms were musculoskeletal, and the troponin, though potentially of concern, was likely a demand related issue related to small vessel disease and a very hypertensive response to pain.  We did not start him on blood pressure medication at that time, but recommended close outpatient follow-up with frequent blood pressure measurements at home, and outpatient evaluation to include an echocardiogram and a nuclear stress test.\par \par The echocardiogram was performed July 5, 2022.  This revealed a normal ejection fraction, without significant valve disease.  Nuclear stress testing was performed July 13, 2022.  This revealed no evidence of ischemia.  Because his blood pressure was elevated at the last visit, I started valsartan, which was subsequently increased.\par \par He presents to the office today having been feeling well.  He does not report reproducible chest discomfort suggestive of angina.  He denies orthopnea, PND and edema.  He denies palpitations, dizziness and syncope. His blood pressure has been labile, but generally elevated.  He has had an ear infection and congestion, and is on antibiotics.  This process has been very slow to improve.

## 2023-10-26 ENCOUNTER — NON-APPOINTMENT (OUTPATIENT)
Age: 63
End: 2023-10-26

## 2023-11-01 ENCOUNTER — RX RENEWAL (OUTPATIENT)
Age: 63
End: 2023-11-01

## 2023-11-16 ENCOUNTER — EMERGENCY (EMERGENCY)
Facility: HOSPITAL | Age: 63
LOS: 1 days | Discharge: HOME | End: 2023-11-16
Attending: STUDENT IN AN ORGANIZED HEALTH CARE EDUCATION/TRAINING PROGRAM | Admitting: STUDENT IN AN ORGANIZED HEALTH CARE EDUCATION/TRAINING PROGRAM
Payer: COMMERCIAL

## 2023-11-16 VITALS
RESPIRATION RATE: 16 BRPM | SYSTOLIC BLOOD PRESSURE: 178 MMHG | WEIGHT: 190.04 LBS | TEMPERATURE: 99 F | HEIGHT: 68 IN | HEART RATE: 90 BPM | DIASTOLIC BLOOD PRESSURE: 77 MMHG | OXYGEN SATURATION: 97 %

## 2023-11-16 DIAGNOSIS — Z90.89 ACQUIRED ABSENCE OF OTHER ORGANS: Chronic | ICD-10-CM

## 2023-11-16 DIAGNOSIS — G56.02 CARPAL TUNNEL SYNDROME, LEFT UPPER LIMB: Chronic | ICD-10-CM

## 2023-11-16 LAB
ALBUMIN SERPL ELPH-MCNC: 3.6 G/DL — SIGNIFICANT CHANGE UP (ref 3.3–5)
ALBUMIN SERPL ELPH-MCNC: 3.6 G/DL — SIGNIFICANT CHANGE UP (ref 3.3–5)
ALP SERPL-CCNC: 99 U/L — SIGNIFICANT CHANGE UP (ref 40–120)
ALP SERPL-CCNC: 99 U/L — SIGNIFICANT CHANGE UP (ref 40–120)
ALT FLD-CCNC: 40 U/L — SIGNIFICANT CHANGE UP (ref 12–78)
ALT FLD-CCNC: 40 U/L — SIGNIFICANT CHANGE UP (ref 12–78)
ANION GAP SERPL CALC-SCNC: 4 MMOL/L — LOW (ref 5–17)
ANION GAP SERPL CALC-SCNC: 4 MMOL/L — LOW (ref 5–17)
AST SERPL-CCNC: 27 U/L — SIGNIFICANT CHANGE UP (ref 15–37)
AST SERPL-CCNC: 27 U/L — SIGNIFICANT CHANGE UP (ref 15–37)
BASOPHILS # BLD AUTO: 0.11 K/UL — SIGNIFICANT CHANGE UP (ref 0–0.2)
BASOPHILS # BLD AUTO: 0.11 K/UL — SIGNIFICANT CHANGE UP (ref 0–0.2)
BASOPHILS NFR BLD AUTO: 1 % — SIGNIFICANT CHANGE UP (ref 0–2)
BASOPHILS NFR BLD AUTO: 1 % — SIGNIFICANT CHANGE UP (ref 0–2)
BILIRUB SERPL-MCNC: 0.6 MG/DL — SIGNIFICANT CHANGE UP (ref 0.2–1.2)
BILIRUB SERPL-MCNC: 0.6 MG/DL — SIGNIFICANT CHANGE UP (ref 0.2–1.2)
BUN SERPL-MCNC: 19 MG/DL — SIGNIFICANT CHANGE UP (ref 7–23)
BUN SERPL-MCNC: 19 MG/DL — SIGNIFICANT CHANGE UP (ref 7–23)
CALCIUM SERPL-MCNC: 8.8 MG/DL — SIGNIFICANT CHANGE UP (ref 8.5–10.1)
CALCIUM SERPL-MCNC: 8.8 MG/DL — SIGNIFICANT CHANGE UP (ref 8.5–10.1)
CHLORIDE SERPL-SCNC: 107 MMOL/L — SIGNIFICANT CHANGE UP (ref 96–108)
CHLORIDE SERPL-SCNC: 107 MMOL/L — SIGNIFICANT CHANGE UP (ref 96–108)
CO2 SERPL-SCNC: 30 MMOL/L — SIGNIFICANT CHANGE UP (ref 22–31)
CO2 SERPL-SCNC: 30 MMOL/L — SIGNIFICANT CHANGE UP (ref 22–31)
CREAT SERPL-MCNC: 1.1 MG/DL — SIGNIFICANT CHANGE UP (ref 0.5–1.3)
CREAT SERPL-MCNC: 1.1 MG/DL — SIGNIFICANT CHANGE UP (ref 0.5–1.3)
EGFR: 75 ML/MIN/1.73M2 — SIGNIFICANT CHANGE UP
EGFR: 75 ML/MIN/1.73M2 — SIGNIFICANT CHANGE UP
EOSINOPHIL # BLD AUTO: 0.13 K/UL — SIGNIFICANT CHANGE UP (ref 0–0.5)
EOSINOPHIL # BLD AUTO: 0.13 K/UL — SIGNIFICANT CHANGE UP (ref 0–0.5)
EOSINOPHIL NFR BLD AUTO: 1.2 % — SIGNIFICANT CHANGE UP (ref 0–6)
EOSINOPHIL NFR BLD AUTO: 1.2 % — SIGNIFICANT CHANGE UP (ref 0–6)
FLUAV AG NPH QL: SIGNIFICANT CHANGE UP
FLUAV AG NPH QL: SIGNIFICANT CHANGE UP
FLUBV AG NPH QL: SIGNIFICANT CHANGE UP
FLUBV AG NPH QL: SIGNIFICANT CHANGE UP
GLUCOSE SERPL-MCNC: 112 MG/DL — HIGH (ref 70–99)
GLUCOSE SERPL-MCNC: 112 MG/DL — HIGH (ref 70–99)
HCT VFR BLD CALC: 42.1 % — SIGNIFICANT CHANGE UP (ref 39–50)
HCT VFR BLD CALC: 42.1 % — SIGNIFICANT CHANGE UP (ref 39–50)
HGB BLD-MCNC: 14 G/DL — SIGNIFICANT CHANGE UP (ref 13–17)
HGB BLD-MCNC: 14 G/DL — SIGNIFICANT CHANGE UP (ref 13–17)
IMM GRANULOCYTES NFR BLD AUTO: 0.8 % — SIGNIFICANT CHANGE UP (ref 0–0.9)
IMM GRANULOCYTES NFR BLD AUTO: 0.8 % — SIGNIFICANT CHANGE UP (ref 0–0.9)
LYMPHOCYTES # BLD AUTO: 1.14 K/UL — SIGNIFICANT CHANGE UP (ref 1–3.3)
LYMPHOCYTES # BLD AUTO: 1.14 K/UL — SIGNIFICANT CHANGE UP (ref 1–3.3)
LYMPHOCYTES # BLD AUTO: 10.5 % — LOW (ref 13–44)
LYMPHOCYTES # BLD AUTO: 10.5 % — LOW (ref 13–44)
MCHC RBC-ENTMCNC: 31.3 PG — SIGNIFICANT CHANGE UP (ref 27–34)
MCHC RBC-ENTMCNC: 31.3 PG — SIGNIFICANT CHANGE UP (ref 27–34)
MCHC RBC-ENTMCNC: 33.3 GM/DL — SIGNIFICANT CHANGE UP (ref 32–36)
MCHC RBC-ENTMCNC: 33.3 GM/DL — SIGNIFICANT CHANGE UP (ref 32–36)
MCV RBC AUTO: 94.2 FL — SIGNIFICANT CHANGE UP (ref 80–100)
MCV RBC AUTO: 94.2 FL — SIGNIFICANT CHANGE UP (ref 80–100)
MONOCYTES # BLD AUTO: 1.08 K/UL — HIGH (ref 0–0.9)
MONOCYTES # BLD AUTO: 1.08 K/UL — HIGH (ref 0–0.9)
MONOCYTES NFR BLD AUTO: 9.9 % — SIGNIFICANT CHANGE UP (ref 2–14)
MONOCYTES NFR BLD AUTO: 9.9 % — SIGNIFICANT CHANGE UP (ref 2–14)
NEUTROPHILS # BLD AUTO: 8.31 K/UL — HIGH (ref 1.8–7.4)
NEUTROPHILS # BLD AUTO: 8.31 K/UL — HIGH (ref 1.8–7.4)
NEUTROPHILS NFR BLD AUTO: 76.6 % — SIGNIFICANT CHANGE UP (ref 43–77)
NEUTROPHILS NFR BLD AUTO: 76.6 % — SIGNIFICANT CHANGE UP (ref 43–77)
NRBC # BLD: 0 /100 WBCS — SIGNIFICANT CHANGE UP (ref 0–0)
NRBC # BLD: 0 /100 WBCS — SIGNIFICANT CHANGE UP (ref 0–0)
PLATELET # BLD AUTO: 207 K/UL — SIGNIFICANT CHANGE UP (ref 150–400)
PLATELET # BLD AUTO: 207 K/UL — SIGNIFICANT CHANGE UP (ref 150–400)
POTASSIUM SERPL-MCNC: 4.1 MMOL/L — SIGNIFICANT CHANGE UP (ref 3.5–5.3)
POTASSIUM SERPL-MCNC: 4.1 MMOL/L — SIGNIFICANT CHANGE UP (ref 3.5–5.3)
POTASSIUM SERPL-SCNC: 4.1 MMOL/L — SIGNIFICANT CHANGE UP (ref 3.5–5.3)
POTASSIUM SERPL-SCNC: 4.1 MMOL/L — SIGNIFICANT CHANGE UP (ref 3.5–5.3)
PROT SERPL-MCNC: 7.5 G/DL — SIGNIFICANT CHANGE UP (ref 6–8.3)
PROT SERPL-MCNC: 7.5 G/DL — SIGNIFICANT CHANGE UP (ref 6–8.3)
RBC # BLD: 4.47 M/UL — SIGNIFICANT CHANGE UP (ref 4.2–5.8)
RBC # BLD: 4.47 M/UL — SIGNIFICANT CHANGE UP (ref 4.2–5.8)
RBC # FLD: 13.3 % — SIGNIFICANT CHANGE UP (ref 10.3–14.5)
RBC # FLD: 13.3 % — SIGNIFICANT CHANGE UP (ref 10.3–14.5)
RSV RNA NPH QL NAA+NON-PROBE: SIGNIFICANT CHANGE UP
RSV RNA NPH QL NAA+NON-PROBE: SIGNIFICANT CHANGE UP
SARS-COV-2 RNA SPEC QL NAA+PROBE: SIGNIFICANT CHANGE UP
SARS-COV-2 RNA SPEC QL NAA+PROBE: SIGNIFICANT CHANGE UP
SODIUM SERPL-SCNC: 141 MMOL/L — SIGNIFICANT CHANGE UP (ref 135–145)
SODIUM SERPL-SCNC: 141 MMOL/L — SIGNIFICANT CHANGE UP (ref 135–145)
WBC # BLD: 10.86 K/UL — HIGH (ref 3.8–10.5)
WBC # BLD: 10.86 K/UL — HIGH (ref 3.8–10.5)
WBC # FLD AUTO: 10.86 K/UL — HIGH (ref 3.8–10.5)
WBC # FLD AUTO: 10.86 K/UL — HIGH (ref 3.8–10.5)

## 2023-11-16 PROCEDURE — 71045 X-RAY EXAM CHEST 1 VIEW: CPT | Mod: 26

## 2023-11-16 PROCEDURE — 99284 EMERGENCY DEPT VISIT MOD MDM: CPT

## 2023-11-16 RX ORDER — AZITHROMYCIN 500 MG/1
1 TABLET, FILM COATED ORAL
Qty: 4 | Refills: 0
Start: 2023-11-16 | End: 2023-11-19

## 2023-11-16 RX ORDER — AZITHROMYCIN 500 MG/1
500 TABLET, FILM COATED ORAL ONCE
Refills: 0 | Status: COMPLETED | OUTPATIENT
Start: 2023-11-16 | End: 2023-11-16

## 2023-11-16 RX ORDER — SODIUM CHLORIDE 9 MG/ML
1000 INJECTION INTRAMUSCULAR; INTRAVENOUS; SUBCUTANEOUS ONCE
Refills: 0 | Status: COMPLETED | OUTPATIENT
Start: 2023-11-16 | End: 2023-11-16

## 2023-11-16 RX ORDER — KETOROLAC TROMETHAMINE 30 MG/ML
15 SYRINGE (ML) INJECTION ONCE
Refills: 0 | Status: DISCONTINUED | OUTPATIENT
Start: 2023-11-16 | End: 2023-11-16

## 2023-11-16 RX ADMIN — Medication 15 MILLIGRAM(S): at 22:24

## 2023-11-16 RX ADMIN — Medication 15 MILLIGRAM(S): at 22:54

## 2023-11-16 RX ADMIN — SODIUM CHLORIDE 1000 MILLILITER(S): 9 INJECTION INTRAMUSCULAR; INTRAVENOUS; SUBCUTANEOUS at 22:24

## 2023-11-16 NOTE — ED PROVIDER NOTE - NSFOLLOWUPINSTRUCTIONS_ED_ALL_ED_FT
1. Continue azithromycin for the next four days  2. Follow up with your primary care doctor  3. Seek medical attention for any new or worsening symptoms.

## 2023-11-16 NOTE — ED PROVIDER NOTE - OBJECTIVE STATEMENT
64 y/o M PMH of DM1 and Hypothyroidism presenting with 62 y/o M PMH of DM1 and Hypothyroidism presenting with chills and lethargy.     Patient states he and his child this week have developed symptoms and today at work noted chills. No fevers. No cough but feels 'tickle' in throat and has generalized body aches. States that BG has been controlled. No nausea/vomiting or diarrhea. No urinary symptoms or abdominal pain.

## 2023-11-16 NOTE — ED PROVIDER NOTE - CLINICAL SUMMARY MEDICAL DECISION MAKING FREE TEXT BOX
62 y/o M PMH of DM1 and Hypothyroidism presenting with chills and lethargy.   r/o pneumonia  r/o Covid/Flu  Check screening labs, CXR  CXR notable for possible R sided developing infiltrate- treat with course of azithromycin.

## 2023-11-16 NOTE — ED PROVIDER NOTE - PATIENT PORTAL LINK FT
You can access the FollowMyHealth Patient Portal offered by [facility] by registering at the following website: [email or web address]. By joining [facility]’s FollowMyHealth portal, you will also be able to view your health information using other applications (apps) compatible with our system.

## 2023-11-16 NOTE — ED ADULT NURSE NOTE - NSFALLUNIVINTERV_ED_ALL_ED
Bed/Stretcher in lowest position, wheels locked, appropriate side rails in place/Call bell, personal items and telephone in reach/Instruct patient to call for assistance before getting out of bed/chair/stretcher/Non-slip footwear applied when patient is off stretcher/[place] to call system/Physically safe environment - no spills, clutter or unnecessary equipment/Purposeful proactive rounding/Room/bathroom lighting operational, light cord in reach

## 2023-11-17 VITALS
DIASTOLIC BLOOD PRESSURE: 75 MMHG | OXYGEN SATURATION: 95 % | SYSTOLIC BLOOD PRESSURE: 164 MMHG | HEART RATE: 80 BPM | RESPIRATION RATE: 16 BRPM

## 2023-11-17 PROCEDURE — 87637 SARSCOV2&INF A&B&RSV AMP PRB: CPT

## 2023-11-17 PROCEDURE — 80053 COMPREHEN METABOLIC PANEL: CPT

## 2023-11-17 PROCEDURE — 99284 EMERGENCY DEPT VISIT MOD MDM: CPT | Mod: 25

## 2023-11-17 PROCEDURE — 85025 COMPLETE CBC W/AUTO DIFF WBC: CPT

## 2023-11-17 PROCEDURE — 96374 THER/PROPH/DIAG INJ IV PUSH: CPT

## 2023-11-17 PROCEDURE — 36415 COLL VENOUS BLD VENIPUNCTURE: CPT

## 2023-11-17 PROCEDURE — 71045 X-RAY EXAM CHEST 1 VIEW: CPT

## 2023-11-17 RX ADMIN — AZITHROMYCIN 500 MILLIGRAM(S): 500 TABLET, FILM COATED ORAL at 00:01

## 2023-11-29 NOTE — ED PROVIDER NOTE - CARE PROVIDERS DIRECT ADDRESSES
Please call and schedule follow-up appointment with your primary care physician to be seen in the next 1 to 2 days. Please see referral to orthopedic surgery.     Return to the ER for any severe persistent pain, new fevers, new weakness, numbness or tingling to the leg, or other new and concerning symptoms for reevaluation    
[email or web address]

## 2023-12-13 ENCOUNTER — APPOINTMENT (OUTPATIENT)
Dept: CARDIOLOGY | Facility: CLINIC | Age: 63
End: 2023-12-13
Payer: COMMERCIAL

## 2023-12-13 ENCOUNTER — NON-APPOINTMENT (OUTPATIENT)
Age: 63
End: 2023-12-13

## 2023-12-13 VITALS — DIASTOLIC BLOOD PRESSURE: 75 MMHG | SYSTOLIC BLOOD PRESSURE: 150 MMHG

## 2023-12-13 VITALS
WEIGHT: 189 LBS | HEART RATE: 67 BPM | SYSTOLIC BLOOD PRESSURE: 155 MMHG | DIASTOLIC BLOOD PRESSURE: 81 MMHG | BODY MASS INDEX: 29.66 KG/M2 | OXYGEN SATURATION: 99 % | HEIGHT: 67 IN

## 2023-12-13 PROCEDURE — 93000 ELECTROCARDIOGRAM COMPLETE: CPT

## 2023-12-13 PROCEDURE — 99214 OFFICE O/P EST MOD 30 MIN: CPT

## 2023-12-13 NOTE — HISTORY OF PRESENT ILLNESS
[FreeTextEntry1] : Vipul presented to the office today for a follow-up evaluation.  He was last seen in the office 6 months ago.  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  He did not carry a diagnosis of hypertension.  He presented to the emergency department June 2022 with chest discomfort and very high blood pressures, associated with mild elevations in his troponin.  I felt that his symptoms were musculoskeletal, and the troponin, though potentially of concern, was likely a demand related issue related to small vessel disease and a very hypertensive response to pain.  We did not start him on blood pressure medication at that time, but recommended close outpatient follow-up with frequent blood pressure measurements at home, and outpatient evaluation to include an echocardiogram and a nuclear stress test.  The echocardiogram was performed July 5, 2022.  This revealed a normal ejection fraction, without significant valve disease.  Nuclear stress testing was performed July 13, 2022.  This revealed no evidence of ischemia.  Because his blood pressure was elevated, I started valsartan, which was subsequently increased.  More recently, he was started on amlodipine, which was also increased, and hydrochlorothiazide, which was started 3 days ago.  He presents to the office today having been feeling well.  He does not report reproducible chest discomfort suggestive of angina.  He denies orthopnea, PND and edema.  He denies palpitations, dizziness and syncope. His blood pressure has been labile, but generally elevated.   He usually gets numbers in the range of 150 systolic.

## 2023-12-13 NOTE — DISCUSSION/SUMMARY
[FreeTextEntry1] : His chest discomfort has resolved.  It is not clear, but I suspect that this was a musculoskeletal issue.  He does have type 1 diabetes, as well as essential hypertension.   I reviewed his recent labs and EKGs, His echocardiogram was performed July 5, 2022.  This revealed a normal ejection fraction, without significant valve disease.  Nuclear stress testing was performed July 13, 2022.  This revealed no evidence of ischemia.      His blood pressure remains elevated.  Hopefully he will have some sort of impact from hydrochlorothiazide.  We discussed the possibility that it might make his glucose worse, and we will certainly need to keep an eye on that.  He will plan to have blood work done in a couple of weeks.  He will send us blood pressures at that time, and we can see whether his hydrochlorothiazide needs to be increased.  We will decide on his next visit pending his blood pressure trends and overall clinical course. [EKG obtained to assist in diagnosis and management of assessed problem(s)] : EKG obtained to assist in diagnosis and management of assessed problem(s)

## 2023-12-31 NOTE — H&P PST ADULT - FUNCTIONAL LEVEL PRIOR: EATING
[facility]  332/1  PROGRESS NOTE   Patient: Grant Lynn  Today's Date: 12/31/2023    YOB: 1972  Admission Date: 12/30/2023    MRN: [id number]  Inpatient LOS: 0    Attending: Walter Astorga MD  Hospital Day: Hospital Day: 2    Subjective   HISTORY AND SUBJECTIVE COMPLAINTS     Chief Complaint:   Alcohol withdrawal    Interval History / Subjective:   Resting in bed, currently on fentayl, versed, propofol, precedex. Will wean as toleratd. Intubated     Hospital Course:  Grant Lynn is a 51 year old male who presented on 12/30/2023 with complaints of Mental Health Problem  .    ROS:  Pertinent systems negative except as above.    Objective   PHYSICAL EXAMINATION     Vital 24 Hour Range Most Recent Value   Temperature Temp  Min: 97.1 °F (36.2 °C)  Max: 98.1 °F (36.7 °C) 97.2 °F (36.2 °C)   Pulse Pulse  Min: 40  Max: 121 71   Respiratory Resp  Min: 14  Max: 25 14   Blood Pressure BP  Min: 77/48  Max: 186/137 (!) 160/55   Pulse Oximetry SpO2  Min: 94 %  Max: 100 % 100 %   Arterial BP No data recorded     O2 No data recorded       Recorded Intake and Output:  Intake/Output Summary (Last 24 hours) at 12/31/2023 1603  Last data filed at 12/31/2023 1519  Gross per 24 hour   Intake 1675.12 ml   Output 2600 ml   Net -924.88 ml      Recorded Last Stool Occurrence:       Vital Most Recent Value First Value   Weight 70.1 kg (154 lb 8.7 oz) Weight: 74 kg (163 lb 2.3 oz)   Height 6' 2\" (188 cm) Height: 6' 2\" (188 cm)   BMI 19.84 N/A     General: Looks well no apparent distress and sedated  CV: regular rate and rhythm  Resp: clear to auscultation bilaterally  Abd: soft, nontender, and nondistended  Ext:  sedated, cannot assess   Skin: no rashes, lesions, or ulcers noted  Neuro:  intubated and sedated     TEST RESULTS     Labs: The Laboratory values listed below have been reviewed and pertinent findings discussed in the Assessment and Plan.    Laboratory values:   Recent Labs   Lab  12/31/23  0532 12/30/23  2249   WBC 4.9 7.9   HGB 12.4* 13.2   HCT 35.0* 37.3*    257         Recent Labs   Lab 12/31/23  1138 12/31/23  0532 12/30/23  2249   SODIUM 136 132* 126*   POTASSIUM  --  3.9 3.8   CHLORIDE  --  96* 90*   CO2  --  28 23   CALCIUM  --  8.5 8.7   GLUCOSE  --  100* 95   BUN  --  5* 5*   CREATININE  --  0.53* 0.55*   MG  --   --  1.9          Radiology: Imaging studies have been reviewed and pertinent findings discussed in the Assessment and Plan.  Results for orders placed or performed during the hospital encounter of 12/30/23 (from the past 48 hour(s))   XR CHEST AP OR PA    Impression    IMPRESSION:      Endotracheal tube in place, with the tip projecting over the midthoracic  trachea. Enteric tube in place, with the tip projecting over the stomach.    The heart and pulmonary vessels are within normal limits.    Coarse interstitial markings throughout the lower lungs, similar to prior  exam. No pleural effusion or pneumothorax.      Electronically Signed by: Krystina Winters MD  Signed on: 12/31/2023 12:34 AM  Created on Workstation ID: [id number]  Signed on Workstation ID: [id number]   CT HEAD WO CONTRAST    Impression    IMPRESSION:    No acute intracranial findings.      Electronically Signed by: Krystina Winters MD  Signed on: 12/31/2023 1:26 AM  Created on Workstation ID: [id number]  Signed on Workstation ID: [id number]   XR CHEST POST TUBE OR LINE PLACEMENT 1 VIEW    Impression    IMPRESSION:    Right internal jugular central venous catheter tip at the cavoatrial  junction. No pneumothorax    Electronically Signed by: Phuc Jolley MD  Signed on: 12/31/2023 2:10 AM  Created on Workstation ID: [id number]  Signed on Workstation ID: [id number]        ANCILLARY ORDERS     Diet:  Npo Diet Without Exceptions  Telemetry: On  Consults:    IP CONSULT TO PSYCHIATRY  Therapy Orders:   No orders of the defined types were placed in this encounter.      ADVANCED DIRECTIVES     Code Status: Full  Resuscitation         ASSESSMENT AND PLAN     Severe Alcohol withdrawal   Hx of heavy alcohol dependence  Acute hypoxic respiratory failure  - low alcohol level, compared to previous levels  - Presenting symptoms consistent with alcohol withdrawal induced hallucinations, delirium  - Per CIWA protocol required multiple sedative agents  - Will wean as able, start w/ Precedex 2/2 bradycardia  - Sedation holiday as able  - Extubation trial per ICU     Moderate Hyponatremia   - beer bethany, hypovolemia  - improving w/ fluid resuscitation     Rhabdomyolysis   - Elevated CK  - TrendCK  - continue fluid thx     Hx of Bipolar 1, Anxiety   Major depressive disorder   - hold home meds  - consider Psych consult when more appropriate   Chronic hepatitis C  Nicotine dependence  Smoking status: Current Tobacco User    Nutrition status: Does Not Meet Criteria for Malnutrition   Body mass index is 19.84 kg/m². - Patient has an appropriate weight with BMI 18.5-24  DVT Prophylaxis: Lovenox prophylactic dosing (dose adjusted as per renal function)       DISCHARGE PLANNING     The patient's treatment plans were discussed with patient and RN.    Discharge Planning    Barriers to discharge: Patient is not medically ready and needs to remain in the hospital today due to severe alcohol withdrawal   Anticipated discharge destination: Home  Expected Discharge Date: TBD  unknown          Walter Astorga MD, MDHospitalist  12/31/2023  4:03 PM      (0) independent

## 2024-01-03 RX ORDER — AMLODIPINE BESYLATE 10 MG/1
10 TABLET ORAL
Qty: 90 | Refills: 2 | Status: ACTIVE | COMMUNITY
Start: 2023-06-14 | End: 1900-01-01

## 2024-01-09 RX ORDER — VALSARTAN 320 MG/1
320 TABLET, COATED ORAL
Qty: 90 | Refills: 3 | Status: ACTIVE | COMMUNITY
Start: 2022-06-29

## 2024-01-10 ENCOUNTER — OUTPATIENT (OUTPATIENT)
Dept: OUTPATIENT SERVICES | Facility: HOSPITAL | Age: 64
LOS: 1 days | End: 2024-01-10

## 2024-01-10 DIAGNOSIS — I10 ESSENTIAL (PRIMARY) HYPERTENSION: ICD-10-CM

## 2024-01-10 DIAGNOSIS — G56.02 CARPAL TUNNEL SYNDROME, LEFT UPPER LIMB: Chronic | ICD-10-CM

## 2024-01-10 DIAGNOSIS — Z90.89 ACQUIRED ABSENCE OF OTHER ORGANS: Chronic | ICD-10-CM

## 2024-01-10 LAB
ALBUMIN SERPL ELPH-MCNC: 4.5 G/DL
ALP BLD-CCNC: 86 U/L
ALT SERPL-CCNC: 28 U/L
ANION GAP SERPL CALC-SCNC: 12 MMOL/L
ANION GAP SERPL CALC-SCNC: 13 MMOL/L
AST SERPL-CCNC: 26 U/L
BILIRUB SERPL-MCNC: 0.3 MG/DL
BUN SERPL-MCNC: 25 MG/DL
BUN SERPL-MCNC: 27 MG/DL
CALCIUM SERPL-MCNC: 9.2 MG/DL
CALCIUM SERPL-MCNC: 9.2 MG/DL
CHLORIDE SERPL-SCNC: 104 MMOL/L
CHLORIDE SERPL-SCNC: 104 MMOL/L
CHOLEST SERPL-MCNC: 161 MG/DL
CO2 SERPL-SCNC: 23 MMOL/L
CO2 SERPL-SCNC: 24 MMOL/L
CREAT SERPL-MCNC: 1.04 MG/DL
CREAT SERPL-MCNC: 1.08 MG/DL
EGFR: 77 ML/MIN/1.73M2
EGFR: 80 ML/MIN/1.73M2
GLUCOSE SERPL-MCNC: 133 MG/DL
GLUCOSE SERPL-MCNC: 134 MG/DL
HDLC SERPL-MCNC: 58 MG/DL
LDLC SERPL CALC-MCNC: 75 MG/DL
MAGNESIUM SERPL-MCNC: 2.1 MG/DL
NONHDLC SERPL-MCNC: 102 MG/DL
POTASSIUM SERPL-SCNC: 3.9 MMOL/L
POTASSIUM SERPL-SCNC: 3.9 MMOL/L
PROT SERPL-MCNC: 6.6 G/DL
SODIUM SERPL-SCNC: 140 MMOL/L
SODIUM SERPL-SCNC: 140 MMOL/L
TRIGL SERPL-MCNC: 161 MG/DL
TSH SERPL-ACNC: 4.97 UIU/ML

## 2024-05-08 PROBLEM — Z87.19 HISTORY OF IBS: Status: ACTIVE | Noted: 2019-10-30

## 2024-05-08 PROBLEM — E11.9 DIABETES MELLITUS: Status: ACTIVE | Noted: 2019-10-30

## 2024-05-08 PROBLEM — E78.5 HYPERLIPIDEMIA: Status: ACTIVE | Noted: 2022-06-29

## 2024-05-08 PROBLEM — E03.9 HYPOTHYROIDISM: Status: ACTIVE | Noted: 2019-10-30

## 2024-05-09 ENCOUNTER — APPOINTMENT (OUTPATIENT)
Dept: GASTROENTEROLOGY | Facility: CLINIC | Age: 64
End: 2024-05-09
Payer: COMMERCIAL

## 2024-05-09 VITALS
BODY MASS INDEX: 29.51 KG/M2 | DIASTOLIC BLOOD PRESSURE: 70 MMHG | SYSTOLIC BLOOD PRESSURE: 130 MMHG | HEART RATE: 58 BPM | HEIGHT: 67 IN | OXYGEN SATURATION: 92 % | WEIGHT: 188 LBS

## 2024-05-09 DIAGNOSIS — E11.9 TYPE 2 DIABETES MELLITUS W/OUT COMPLICATIONS: ICD-10-CM

## 2024-05-09 DIAGNOSIS — E66.3 OVERWEIGHT: ICD-10-CM

## 2024-05-09 DIAGNOSIS — E03.9 HYPOTHYROIDISM, UNSPECIFIED: ICD-10-CM

## 2024-05-09 DIAGNOSIS — Z83.79 FAMILY HISTORY OF OTHER DISEASES OF THE DIGESTIVE SYSTEM: ICD-10-CM

## 2024-05-09 DIAGNOSIS — Z87.19 PERSONAL HISTORY OF OTHER DISEASES OF THE DIGESTIVE SYSTEM: ICD-10-CM

## 2024-05-09 DIAGNOSIS — R10.13 EPIGASTRIC PAIN: ICD-10-CM

## 2024-05-09 DIAGNOSIS — E78.5 HYPERLIPIDEMIA, UNSPECIFIED: ICD-10-CM

## 2024-05-09 PROCEDURE — 99204 OFFICE O/P NEW MOD 45 MIN: CPT

## 2024-05-09 NOTE — PHYSICAL EXAM
[Abdomen Tenderness] : non-tender [No Masses] : no abdominal mass palpated [Abdomen Soft] : soft [] : no hepatosplenomegaly [Cervical Lymph Nodes Enlarged Posterior Bilaterally] : no posterior cervical lymphadenopathy [Supraclavicular Lymph Nodes Enlarged Bilaterally] : no supraclavicular lymphadenopathy [Abnormal Walk] : normal gait [No Clubbing, Cyanosis] : no clubbing or cyanosis of the fingernails [Normal Color / Pigmentation] : normal skin color and pigmentation [Normal] : oriented to person, place, and time [de-identified] : insulin pump

## 2024-05-09 NOTE — ASSESSMENT
[FreeTextEntry1] : IMPRESSION: #  Dyspepsia for years  -  Sometimes feels upper abdomen gas/cramp - can last a day or two and then comes back a month later   - Says he take Gaviscon for it and says he does not know if it helps. -  Says it is like a worried feeling. -  Gas, meaning when laying down and pressing on upper abdomen he will belching, or fart and it will make him feel better  -  heartburn years ago - Nexium, pepcid in past  -  EGD by DR. Odell on 9/2020:  Nl esophagus.  Irregular z-line s/p bx (neg for BE). Gastric erythema s/p bx (neg for HP and IM).  Nml duodenum s/p bx (neg for celiac disease).  -  Patient says he never had Basim's esophagus   #  Screening colonoscopy  -  Colonoscopy by Dr. Odell on 9/2020:  Small hemorrhoids.  Rpt in 9/2025. -  As per Dr. Odell note on 3/2021 - Colonoscopy in the past polyp was removed  #  Family history of coliits and cancer  -  Mother had a colostomy bag from colitis year ago - he says he is unsure what the colitis was from.  -  Maternal aunt had possible stomach or colon cancer at age 70s. -  Another maternal aunt had cancer, but he is not sure what type of cancer in 70s -  Maternal uncle had cancer, but he is not sure what type of cancer in 70s  #  Comorbidities:  DM, HTN, HLD, hypothyroidism   PLAN:  I will plan for an upper endoscopy under monitored anesthesia care to rule out Erosive Reflux Disease, Burnham's Esophagus, assess integrity of anti-reflux barrier, exclude other diseases such as Eosinophilic Esophagitis, Achalasia, Cancer etc.   Risks, benefits, and alternatives of the procedure were discussed with the patient. Patient understands and would like to hold off for now.  Department of medical genetics - contact information given.   Blood test Abdominal ultrasound H. pylori breath test  Discussed colonoscopy however no symptoms to warrant one this year we could this year if abnormal genetics.   Follow up in 3 months.

## 2024-05-09 NOTE — HISTORY OF PRESENT ILLNESS
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   Previously was seeing Dr. Odell who passed away.   Says he still issues - sometimes feels upper abdomen gas/cramp - can last a day or two and then comes back a month later - says he take Gaviscon for it and says he does not know if it helps.    Says these symptoms for years.  Says it is like a worried feeling.    He says its gas, meaning when laying down and pressing on upper abdomen he will belching, or fart and it will make him feel better -says he will deal with this a day or two and then is fine.   Patient denies having constipation, diarrhea, loss of appetite, weight loss, melena and hematochezia.   Years ago he had reflux symptoms, heartburn where he was taking Nexium.    Patient denies having heartburn, regurgitation, difficulty swallowing, painful swallowing, nausea, vomiting.  Says he has had a chronic cough since Nov, 2023 seen ENT, pulmonary now its going away - was told that cough may have been do to acid reflux.  He says he did have pneumonia in Nov, 2023.    He says his mother had a colostomy bag from colitis year ago - he says he is unsure what the colitis was from.  He says his father had IBS.  His mother had 11 siblings - she herself had never had cancer.   Maternal aunt had possible stomach or colon cancer at age 70s.  Another maternal aunt had cancer, but he is not sure what type of cancer in 70s Maternal uncle had cancer, but he is not sure what type of cancer in 70s  Patient denies family history of colon polyp and other gastrointestinal cancers.  All other review of systems are negative.  Denies cardiac symptoms.

## 2024-05-13 LAB
ALBUMIN SERPL ELPH-MCNC: 4.4 G/DL
ALP BLD-CCNC: 83 U/L
ALT SERPL-CCNC: 22 U/L
ANION GAP SERPL CALC-SCNC: 19 MMOL/L
AST SERPL-CCNC: 17 U/L
BILIRUB SERPL-MCNC: 0.2 MG/DL
BUN SERPL-MCNC: 33 MG/DL
CALCIUM SERPL-MCNC: 9.5 MG/DL
CHLORIDE SERPL-SCNC: 107 MMOL/L
CO2 SERPL-SCNC: 20 MMOL/L
CREAT SERPL-MCNC: 1.17 MG/DL
EGFR: 70 ML/MIN/1.73M2
GLUCOSE SERPL-MCNC: 111 MG/DL
HCT VFR BLD CALC: 42 %
HGB BLD-MCNC: 13.8 G/DL
IGA SER QL IEP: 247 MG/DL
LPL SERPL-CCNC: 27 U/L
MCHC RBC-ENTMCNC: 31.4 PG
MCHC RBC-ENTMCNC: 32.9 GM/DL
MCV RBC AUTO: 95.5 FL
PLATELET # BLD AUTO: 286 K/UL
POTASSIUM SERPL-SCNC: 4.7 MMOL/L
PROT SERPL-MCNC: 6.7 G/DL
RBC # BLD: 4.4 M/UL
RBC # FLD: 13.6 %
SODIUM SERPL-SCNC: 145 MMOL/L
TTG IGG SER IA-ACNC: 1.2 U/ML
TTG IGG SER IA-ACNC: NEGATIVE
UREA BREATH TEST QL: NEGATIVE
WBC # FLD AUTO: 9.41 K/UL

## 2024-06-11 ENCOUNTER — NON-APPOINTMENT (OUTPATIENT)
Age: 64
End: 2024-06-11

## 2024-06-11 ENCOUNTER — APPOINTMENT (OUTPATIENT)
Dept: CARDIOLOGY | Facility: CLINIC | Age: 64
End: 2024-06-11
Payer: COMMERCIAL

## 2024-06-11 VITALS
HEART RATE: 58 BPM | DIASTOLIC BLOOD PRESSURE: 78 MMHG | HEIGHT: 67 IN | WEIGHT: 194 LBS | SYSTOLIC BLOOD PRESSURE: 138 MMHG | OXYGEN SATURATION: 97 % | BODY MASS INDEX: 30.45 KG/M2

## 2024-06-11 DIAGNOSIS — I10 ESSENTIAL (PRIMARY) HYPERTENSION: ICD-10-CM

## 2024-06-11 PROCEDURE — G2211 COMPLEX E/M VISIT ADD ON: CPT

## 2024-06-11 PROCEDURE — 99214 OFFICE O/P EST MOD 30 MIN: CPT

## 2024-06-11 PROCEDURE — 93000 ELECTROCARDIOGRAM COMPLETE: CPT

## 2024-06-11 RX ORDER — HYDROCHLOROTHIAZIDE 12.5 MG/1
12.5 CAPSULE ORAL
Qty: 90 | Refills: 3 | Status: DISCONTINUED | COMMUNITY
Start: 2023-12-11 | End: 2024-06-11

## 2024-06-11 NOTE — HISTORY OF PRESENT ILLNESS
[FreeTextEntry1] : Vipul presented to the office today for a follow-up evaluation.  He was last seen in the office 6 months ago.  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  He did not carry a diagnosis of hypertension.  He presented to the emergency department June 2022 with chest discomfort and very high blood pressures, associated with mild elevations in his troponin.  I felt that his symptoms were musculoskeletal, and the troponin, though potentially of concern, was likely a demand related issue related to small vessel disease and a very hypertensive response to pain.  We did not start him on blood pressure medication at that time, but recommended close outpatient follow-up with frequent blood pressure measurements at home, and outpatient evaluation to include an echocardiogram and a nuclear stress test.  The echocardiogram was performed July 5, 2022.  This revealed a normal ejection fraction, without significant valve disease.  Nuclear stress testing was performed July 13, 2022.  This revealed no evidence of ischemia.  Because his blood pressure was elevated, I started valsartan, which was subsequently increased.  More recently, he was started on amlodipine, which was also increased, and hydrochlorothiazide.  He presents to the office today having been feeling well.  He does not report reproducible chest discomfort suggestive of angina.  He denies orthopnea, PND and edema.  He denies palpitations, dizziness and syncope.  Despite having started hydrochlorothiazide, his blood pressure never really improved.  In addition, he developed a rash on his arms and legs.  Several biopsies were taken about 2 weeks ago, with pathology pending.  There was discussion about the possibility of it being a reaction to the hydrochlorothiazide, which he stopped about 2 weeks ago.  The rash has improved.  He does not feel that the hydrochlorothiazide improved his blood pressure when he started, or that his blood pressure worsened when he stopped.

## 2024-06-11 NOTE — CARDIOLOGY SUMMARY
[de-identified] : December 13, 2023 sinus rhythm cannot rule out anteroseptal infarct June 11, 2024 normal sinus rhythm poor R wave progression

## 2024-06-11 NOTE — DISCUSSION/SUMMARY
[FreeTextEntry1] : His chest discomfort has resolved.  It is not clear, but I suspect that this was a musculoskeletal issue.  He does have type 1 diabetes, as well as essential hypertension.   I reviewed his recent labs and EKGs, His echocardiogram was performed July 5, 2022.  This revealed a normal ejection fraction, without significant valve disease.  Nuclear stress testing was performed July 13, 2022.  This revealed no evidence of ischemia.      His blood pressure remains elevated.  It is not clear that hydrochlorothiazide was helping, and may have been responsible for his rash.  He will discontinue it formally, and we will try hydralazine 10 mg 3 times daily.  He will check his blood pressure intermittently between now and his next visit, and send me blood pressures in about 2 weeks.  He will see me in 3 months. [EKG obtained to assist in diagnosis and management of assessed problem(s)] : EKG obtained to assist in diagnosis and management of assessed problem(s)

## 2024-08-01 NOTE — ED PROVIDER NOTE - CROS ED ROS STATEMENT
[FreeTextEntry1] : Auto-titrating Positive Airway Pressure(APAP) compliance reviewed with patient in office today. Patient is not compliant, but benefiting from APAP usage. Patient nasal mask for better patient comfort and compliance.   Continue atorvastatin 10 mg p.o. daily for hypercholesterolemia. Strongly advised patient on dieting, exercise and weight loss. Medications reviewed. Continue present medications. Return for office follow-up in 2 months.  all other ROS negative except as per HPI

## 2024-08-13 ENCOUNTER — APPOINTMENT (OUTPATIENT)
Dept: GASTROENTEROLOGY | Facility: CLINIC | Age: 64
End: 2024-08-13
Payer: COMMERCIAL

## 2024-08-13 VITALS
WEIGHT: 187 LBS | DIASTOLIC BLOOD PRESSURE: 71 MMHG | HEIGHT: 66 IN | OXYGEN SATURATION: 96 % | BODY MASS INDEX: 30.05 KG/M2 | HEART RATE: 56 BPM | SYSTOLIC BLOOD PRESSURE: 143 MMHG

## 2024-08-13 VITALS — HEIGHT: 67 IN | WEIGHT: 187 LBS | BODY MASS INDEX: 29.35 KG/M2

## 2024-08-13 DIAGNOSIS — R10.9 UNSPECIFIED ABDOMINAL PAIN: ICD-10-CM

## 2024-08-13 PROCEDURE — 99214 OFFICE O/P EST MOD 30 MIN: CPT

## 2024-08-13 RX ORDER — POLYETHYLENE GLYCOL 3350 AND ELECTROLYTES WITH LEMON FLAVOR 236; 22.74; 6.74; 5.86; 2.97 G/4L; G/4L; G/4L; G/4L; G/4L
236 POWDER, FOR SOLUTION ORAL
Qty: 1 | Refills: 0 | Status: ACTIVE | COMMUNITY
Start: 2024-08-13 | End: 1900-01-01

## 2024-08-13 NOTE — PHYSICAL EXAM
[Bowel Sounds] : normal bowel sounds [Abdomen Tenderness] : non-tender [No Masses] : no abdominal mass palpated [Abdomen Soft] : soft [] : no hepatosplenomegaly [Cervical Lymph Nodes Enlarged Posterior Bilaterally] : no posterior cervical lymphadenopathy [Supraclavicular Lymph Nodes Enlarged Bilaterally] : no supraclavicular lymphadenopathy [No Clubbing, Cyanosis] : no clubbing or cyanosis of the fingernails [Normal Color / Pigmentation] : normal skin color and pigmentation [Normal] : oriented to person, place, and time [de-identified] : insulin pump

## 2024-08-13 NOTE — PHYSICAL EXAM
[Bowel Sounds] : normal bowel sounds [Abdomen Tenderness] : non-tender [No Masses] : no abdominal mass palpated [Abdomen Soft] : soft [] : no hepatosplenomegaly [Cervical Lymph Nodes Enlarged Posterior Bilaterally] : no posterior cervical lymphadenopathy [Supraclavicular Lymph Nodes Enlarged Bilaterally] : no supraclavicular lymphadenopathy [No Clubbing, Cyanosis] : no clubbing or cyanosis of the fingernails [Normal Color / Pigmentation] : normal skin color and pigmentation [Normal] : oriented to person, place, and time [de-identified] : insulin pump

## 2024-08-13 NOTE — ASSESSMENT
[FreeTextEntry1] : IMPRESSION: # Dyspepsia for years - discussed upper endoscopy, declined, ordered ultrasound not done -  Every month he has abdominal discomfort/bloated/gaseous - says it makes him tired - can last a day or more.  - Sometimes feels upper abdomen gas/cramp - can last a day or two and then comes back a month later - Gas, meaning when laying down and pressing on upper abdomen he will belching, or fart and it will make him feel better - heartburn years ago - Nexium, pepcid in past -  Can have difficulty having a bowel movement - occasional diarrhea. -  H. pylori negative on 5/2024  -  TTG IgA and IgA neg on 5/2024 - EGD by DR. Odell on 9/2020: Nl esophagus. Irregular z-line s/p bx (neg for BE). Gastric erythema s/p bx (neg for HP and IM). Nml duodenum s/p bx (neg for celiac disease). - Patient says he never had Basim's esophagus  # Screening colonoscopy - Colonoscopy by Dr. Odell on 9/2020: Small hemorrhoids. Rpt in 9/2025. - As per Dr. Odell note on 3/2021 - Colonoscopy in the past polyp was removed  # Family history of colitis and cancer - discussed medical genetics - contact information given. - Mother had a colostomy bag from colitis year ago - he says he is unsure what the colitis was from. - Maternal aunt had possible stomach or colon cancer at age 70s. - Another maternal aunt had cancer, but he is not sure what type of cancer in 70s - Maternal uncle had cancer, but he is not sure what type of cancer in 70s  # Comorbidities: DM, HTN, HLD, hypothyroidism   PLAN I will plan for an upper endoscopy under monitored anesthesia care to rule out Erosive Reflux Disease, Burnham's Esophagus, assess integrity of anti-reflux barrier, exclude other diseases such as Eosinophilic Esophagitis, Achalasia, Cancer etc.   Risks, benefits, and alternatives of the procedure were discussed with the patient. Patient understands and agrees to proceed with the planned procedure.  I have advised the patient to arrange for a colonoscopy to rule out colon polyps, colorectal cancer etc. under monitored anesthesia care.  Risks such as perforation requiring surgery, colostomy, bleeding requiring blood transfusion, infection/sepsis, diverticulitis, colitis, missed colon cancer (2% to 6%), internal organ injury such as splenic hemorrhage (1/6000, risk thin, female gender) etc, adverse reaction to medication etc. and risks of anesthesia including cardiopulmonary compromise requiring ICU care were discussed with patient.  Alternatives were discussed.  Patient verbalized understanding and agrees to proceed with a colonoscopy under anesthesia.  Risk factors for inadequate prep: -  Previous inadequate prep -  Previous colon resection -  Constipation drugs, chronic constipation -  DM, Obesity, stroke, spinal cord injury, Parkinson's   To optimize bowel prep, 2 days prior to colonoscopy, he will take Mag citrate (10 oz) in the afternoon.   Abdominal ultrasound

## 2024-08-13 NOTE — HISTORY OF PRESENT ILLNESS
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    Every month he has abdominal discomfort/bloated/gaseous - says it makes him tired - can last a day or more.  He says he has to take Gaviscon which helps.  no N/V.   Can have difficulty having a bowel movement - occasional diarrhea.   Patient denies having loss of appetite, weight loss, melena and hematochezia.  All other review of systems are negative.  Denies cardiac symptoms.    Initial office visit 5/2024:   Previously was seeing Dr. Odell who passed away. Says he still issues - sometimes feels upper abdomen gas/cramp - can last a day or two and then comes back a month later - says he take Gaviscon for it and says he does not know if it helps. Says these symptoms for years. Says it is like a worried feeling. He says its gas, meaning when laying down and pressing on upper abdomen he will belching, or fart and it will make him feel better -says he will deal with this a day or two and then is fine. Patient denies having constipation, diarrhea, loss of appetite, weight loss, melena and hematochezia. Years ago he had reflux symptoms, heartburn where he was taking Nexium. Patient denies having heartburn, regurgitation, difficulty swallowing, painful swallowing, nausea, vomiting.  Says he has had a chronic cough since Nov, 2023 seen ENT, pulmonary now its going away - was told that cough may have been do to acid reflux. He says he did have pneumonia in Nov, 2023.  He says his mother had a colostomy bag from colitis year ago - he says he is unsure what the colitis was from. He says his father had IBS. His mother had 11 siblings - she herself had never had cancer. Maternal aunt had possible stomach or colon cancer at age 70s. Another maternal aunt had cancer, but he is not sure what type of cancer in 70s Maternal uncle had cancer, but he is not sure what type of cancer in 70s  Patient denies family history of colon polyp and other gastrointestinal cancers.

## 2024-08-13 NOTE — HISTORY OF PRESENT ILLNESS
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    Every month he has abdominal discomfort/bloated/gaseous - says it makes him tired - can last a day or more.  He says he has to take Gaviscon which helps.  no N/V.   Can have difficulty having a bowel movement - occasional diarrhea.   Patient denies having loss of appetite, weight loss, melena and hematochezia.  All other review of systems are negative.  Denies cardiac symptoms.    Initial office visit 5/2024:   Previously was seeing Dr. Odell who passed away. Says he still issues - sometimes feels upper abdomen gas/cramp - can last a day or two and then comes back a month later - says he take Gaviscon for it and says he does not know if it helps. Says these symptoms for years. Says it is like a worried feeling. He says its gas, meaning when laying down and pressing on upper abdomen he will belching, or fart and it will make him feel better -says he will deal with this a day or two and then is fine. Patient denies having constipation, diarrhea, loss of appetite, weight loss, melena and hematochezia. Years ago he had reflux symptoms, heartburn where he was taking Nexium. Patient denies having heartburn, regurgitation, difficulty swallowing, painful swallowing, nausea, vomiting.  Says he has had a chronic cough since Nov, 2023 seen ENT, pulmonary now its going away - was told that cough may have been do to acid reflux. He says he did have pneumonia in Nov, 2023.  He says his mother had a colostomy bag from colitis year ago - he says he is unsure what the colitis was from. He says his father had IBS. His mother had 11 siblings - she herself had never had cancer. Maternal aunt had possible stomach or colon cancer at age 70s. Another maternal aunt had cancer, but he is not sure what type of cancer in 70s Maternal uncle had cancer, but he is not sure what type of cancer in 70s  Patient denies family history of colon polyp and other gastrointestinal cancers.

## 2024-09-11 ENCOUNTER — APPOINTMENT (OUTPATIENT)
Dept: CARDIOLOGY | Facility: CLINIC | Age: 64
End: 2024-09-11
Payer: COMMERCIAL

## 2024-09-11 ENCOUNTER — NON-APPOINTMENT (OUTPATIENT)
Age: 64
End: 2024-09-11

## 2024-09-11 VITALS
HEART RATE: 60 BPM | SYSTOLIC BLOOD PRESSURE: 145 MMHG | BODY MASS INDEX: 30.13 KG/M2 | WEIGHT: 192 LBS | HEIGHT: 67 IN | DIASTOLIC BLOOD PRESSURE: 81 MMHG | OXYGEN SATURATION: 97 %

## 2024-09-11 DIAGNOSIS — I10 ESSENTIAL (PRIMARY) HYPERTENSION: ICD-10-CM

## 2024-09-11 PROCEDURE — 99214 OFFICE O/P EST MOD 30 MIN: CPT

## 2024-09-11 PROCEDURE — G2211 COMPLEX E/M VISIT ADD ON: CPT

## 2024-09-11 PROCEDURE — 93000 ELECTROCARDIOGRAM COMPLETE: CPT

## 2024-09-11 NOTE — HISTORY OF PRESENT ILLNESS
[FreeTextEntry1] : Vipul presented to the office today for a follow-up evaluation.  He was last seen in the office 3 months ago.  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  He did not carry a diagnosis of hypertension.  He presented to the emergency department June 2022 with chest discomfort and very high blood pressures, associated with mild elevations in his troponin.  I felt that his symptoms were musculoskeletal, and the troponin, though potentially of concern, was likely a demand related issue related to small vessel disease and a very hypertensive response to pain.  We did not start him on blood pressure medication at that time, but recommended close outpatient follow-up with frequent blood pressure measurements at home, and outpatient evaluation to include an echocardiogram and a nuclear stress test.  The echocardiogram was performed July 5, 2022.  This revealed a normal ejection fraction, without significant valve disease.  Nuclear stress testing was performed July 13, 2022.  This revealed no evidence of ischemia.  Because his blood pressure was elevated, I started valsartan, which was subsequently increased.  More recently, he was started on amlodipine, which was also increased, and hydrochlorothiazide. There was a concern about HCTZ causing a rash, and biopsies were taken.  It turned out to be purpura, unrelated to HCTZ and so he has remained on it.  He presents to the office today having been feeling well.  He does not report reproducible chest discomfort suggestive of angina.  He denies orthopnea, PND and edema.  He denies palpitations, dizziness and syncope.  His blood pressure has remained in a range including 120s, 130s and 140s, with diastolics that are typically in the 50s and 60s.

## 2024-09-11 NOTE — DISCUSSION/SUMMARY
[EKG obtained to assist in diagnosis and management of assessed problem(s)] : EKG obtained to assist in diagnosis and management of assessed problem(s) [FreeTextEntry1] : Vipul feels well from a cardiovascular perspective.  He does not have any symptoms of concern at this time.  I reviewed his recent labs and EKGs, His echocardiogram was performed July 5, 2022.  This revealed a normal ejection fraction, without significant valve disease.  Nuclear stress testing was performed July 13, 2022.  This revealed no evidence of ischemia.      His systolic blood pressure remains somewhat elevated at times, but his diastolic blood pressure is commonly quite low.  I think that the best compromise is to leave his medications as they are.  I do not feel that additional cardiovascular testing is needed at this time.  I suggest a follow-up in about 6 months.

## 2024-09-11 NOTE — CARDIOLOGY SUMMARY
[de-identified] : December 13, 2023 sinus rhythm cannot rule out anteroseptal infarct June 11, 2024 normal sinus rhythm poor R wave progression September 11, 2024 normal sinus rhythm poor R wave progression

## 2024-09-12 ENCOUNTER — RX RENEWAL (OUTPATIENT)
Age: 64
End: 2024-09-12

## 2024-09-24 NOTE — ASU DISCHARGE PLAN (ADULT/PEDIATRIC). - MEDICATION SUMMARY - MEDICATIONS TO CHANGE
I will SWITCH the dose or number of times a day I take the medications listed below when I get home from the hospital:  None
I have reviewed and confirmed nurses' notes for patient's medications, allergies, medical history, and surgical history.

## 2025-01-02 ENCOUNTER — RX RENEWAL (OUTPATIENT)
Age: 65
End: 2025-01-02

## 2025-01-13 ENCOUNTER — RX RENEWAL (OUTPATIENT)
Age: 65
End: 2025-01-13

## 2025-03-12 ENCOUNTER — APPOINTMENT (OUTPATIENT)
Dept: CARDIOLOGY | Facility: CLINIC | Age: 65
End: 2025-03-12
Payer: COMMERCIAL

## 2025-03-12 ENCOUNTER — NON-APPOINTMENT (OUTPATIENT)
Age: 65
End: 2025-03-12

## 2025-03-12 VITALS
OXYGEN SATURATION: 94 % | WEIGHT: 184 LBS | HEIGHT: 67 IN | DIASTOLIC BLOOD PRESSURE: 65 MMHG | SYSTOLIC BLOOD PRESSURE: 137 MMHG | BODY MASS INDEX: 28.88 KG/M2 | HEART RATE: 68 BPM

## 2025-03-12 DIAGNOSIS — E78.5 HYPERLIPIDEMIA, UNSPECIFIED: ICD-10-CM

## 2025-03-12 DIAGNOSIS — I10 ESSENTIAL (PRIMARY) HYPERTENSION: ICD-10-CM

## 2025-03-12 PROCEDURE — 93000 ELECTROCARDIOGRAM COMPLETE: CPT

## 2025-03-12 PROCEDURE — 99214 OFFICE O/P EST MOD 30 MIN: CPT

## 2025-03-20 ENCOUNTER — RX RENEWAL (OUTPATIENT)
Age: 65
End: 2025-03-20

## 2025-04-18 ENCOUNTER — NON-APPOINTMENT (OUTPATIENT)
Age: 65
End: 2025-04-18

## 2025-04-18 ENCOUNTER — APPOINTMENT (OUTPATIENT)
Dept: CARDIOLOGY | Facility: CLINIC | Age: 65
End: 2025-04-18
Payer: COMMERCIAL

## 2025-04-18 PROCEDURE — 93306 TTE W/DOPPLER COMPLETE: CPT

## 2025-04-18 PROCEDURE — 93925 LOWER EXTREMITY STUDY: CPT

## 2025-04-24 DIAGNOSIS — E78.5 HYPERLIPIDEMIA, UNSPECIFIED: ICD-10-CM

## 2025-04-24 RX ORDER — ROSUVASTATIN CALCIUM 10 MG/1
10 TABLET, FILM COATED ORAL
Qty: 90 | Refills: 0 | Status: ACTIVE | COMMUNITY
Start: 2025-04-24 | End: 1900-01-01

## 2025-06-30 ENCOUNTER — LABORATORY RESULT (OUTPATIENT)
Age: 65
End: 2025-06-30

## 2025-08-20 ENCOUNTER — RX RENEWAL (OUTPATIENT)
Age: 65
End: 2025-08-20

## 2025-09-04 ENCOUNTER — RX RENEWAL (OUTPATIENT)
Age: 65
End: 2025-09-04

## 2025-09-11 ENCOUNTER — APPOINTMENT (OUTPATIENT)
Dept: CARDIOLOGY | Facility: CLINIC | Age: 65
End: 2025-09-11
Payer: COMMERCIAL

## 2025-09-11 VITALS
WEIGHT: 194 LBS | HEIGHT: 67 IN | SYSTOLIC BLOOD PRESSURE: 150 MMHG | OXYGEN SATURATION: 96 % | DIASTOLIC BLOOD PRESSURE: 73 MMHG | HEART RATE: 60 BPM | BODY MASS INDEX: 30.45 KG/M2

## 2025-09-11 DIAGNOSIS — E78.5 HYPERLIPIDEMIA, UNSPECIFIED: ICD-10-CM

## 2025-09-11 DIAGNOSIS — I10 ESSENTIAL (PRIMARY) HYPERTENSION: ICD-10-CM

## 2025-09-11 PROCEDURE — 93000 ELECTROCARDIOGRAM COMPLETE: CPT

## 2025-09-11 PROCEDURE — 99214 OFFICE O/P EST MOD 30 MIN: CPT
